# Patient Record
Sex: MALE | Race: WHITE | URBAN - METROPOLITAN AREA
[De-identification: names, ages, dates, MRNs, and addresses within clinical notes are randomized per-mention and may not be internally consistent; named-entity substitution may affect disease eponyms.]

---

## 2017-01-12 ENCOUNTER — TELEPHONE (OUTPATIENT)
Dept: FAMILY MEDICINE CLINIC | Facility: CLINIC | Age: 57
End: 2017-01-12

## 2017-01-12 NOTE — TELEPHONE ENCOUNTER
Pt is asking to be seen with Dr. Hilaria Holley only   Pt use to be a patient of Dr. Hilaria Holley more than 5 years ago   Please advise if patient can be seen with PCP   Please f/u with patient

## 2017-02-09 ENCOUNTER — OFFICE VISIT (OUTPATIENT)
Dept: FAMILY MEDICINE CLINIC | Facility: CLINIC | Age: 57
End: 2017-02-09

## 2017-02-09 VITALS
RESPIRATION RATE: 16 BRPM | BODY MASS INDEX: 38.65 KG/M2 | DIASTOLIC BLOOD PRESSURE: 94 MMHG | SYSTOLIC BLOOD PRESSURE: 154 MMHG | TEMPERATURE: 98 F | WEIGHT: 270 LBS | HEART RATE: 80 BPM | HEIGHT: 70 IN

## 2017-02-09 DIAGNOSIS — Z86.19 HISTORY OF HEPATITIS C: ICD-10-CM

## 2017-02-09 DIAGNOSIS — Z86.010 HISTORY OF COLON POLYPS: ICD-10-CM

## 2017-02-09 DIAGNOSIS — Z00.00 ANNUAL PHYSICAL EXAM: Primary | ICD-10-CM

## 2017-02-09 DIAGNOSIS — E66.9 OBESITY (BMI 35.0-39.9 WITHOUT COMORBIDITY): ICD-10-CM

## 2017-02-09 DIAGNOSIS — Z82.49 FAMILY HISTORY OF PREMATURE CORONARY HEART DISEASE: ICD-10-CM

## 2017-02-09 DIAGNOSIS — R03.0 ELEVATED BP WITHOUT DIAGNOSIS OF HYPERTENSION: ICD-10-CM

## 2017-02-09 DIAGNOSIS — G47.33 OSA (OBSTRUCTIVE SLEEP APNEA): ICD-10-CM

## 2017-02-09 PROCEDURE — 99396 PREV VISIT EST AGE 40-64: CPT | Performed by: FAMILY MEDICINE

## 2017-02-09 RX ORDER — IBUPROFEN 400 MG/1
2 TABLET ORAL
COMMUNITY
Start: 2013-04-01

## 2017-02-09 NOTE — PROGRESS NOTES
HPI:    Patient ID: Delmar Flores is a 60year old male.   Patient presents with:  Routine Physical  Referral: CPAP    HPI  Hx colon polyps, gallstones (asymptomatic),  Hep C (chronic) succesfully treaty, [place],obesity  Tonsillectomy  FH F DM, heart dis - in distress noted, well developed, well nourishhed, is obese4  Head/Face: normocephalic  Eyes/Vision: pupils are equal, round and reactive to light conjunctiva and lids are normal extraocular motion is intact  Ears/Audiometry:ear canals normal,  tympanic memb apnea)  Needs new equipment, will find out if new sleep study needed    6. History of hepatitis C  Successfully treated, resolved    7. Elevated BP without diagnosis of hypertension  Needs lifestyle changes, is aware.  If BP remains above 140 syst in 6 mo,

## 2017-10-16 ENCOUNTER — OFFICE VISIT (OUTPATIENT)
Dept: FAMILY MEDICINE CLINIC | Facility: CLINIC | Age: 57
End: 2017-10-16

## 2017-10-16 VITALS
TEMPERATURE: 98 F | DIASTOLIC BLOOD PRESSURE: 110 MMHG | BODY MASS INDEX: 40 KG/M2 | WEIGHT: 277 LBS | SYSTOLIC BLOOD PRESSURE: 174 MMHG | HEART RATE: 85 BPM | RESPIRATION RATE: 16 BRPM

## 2017-10-16 DIAGNOSIS — G47.33 OBSTRUCTIVE SLEEP APNEA SYNDROME: Primary | ICD-10-CM

## 2017-10-16 DIAGNOSIS — M25.511 RIGHT SHOULDER PAIN, UNSPECIFIED CHRONICITY: ICD-10-CM

## 2017-10-16 DIAGNOSIS — J01.00 ACUTE NON-RECURRENT MAXILLARY SINUSITIS: ICD-10-CM

## 2017-10-16 DIAGNOSIS — I10 ESSENTIAL HYPERTENSION: ICD-10-CM

## 2017-10-16 PROCEDURE — 99212 OFFICE O/P EST SF 10 MIN: CPT | Performed by: FAMILY MEDICINE

## 2017-10-16 PROCEDURE — 99214 OFFICE O/P EST MOD 30 MIN: CPT | Performed by: FAMILY MEDICINE

## 2017-10-16 RX ORDER — LISINOPRIL 10 MG/1
10 TABLET ORAL DAILY
Qty: 30 TABLET | Refills: 0 | Status: SHIPPED | OUTPATIENT
Start: 2017-10-16 | End: 2017-10-25

## 2017-10-16 RX ORDER — AMOXICILLIN AND CLAVULANATE POTASSIUM 875; 125 MG/1; MG/1
1 TABLET, FILM COATED ORAL 2 TIMES DAILY
Qty: 20 TABLET | Refills: 0 | Status: SHIPPED | OUTPATIENT
Start: 2017-10-16 | End: 2017-10-25

## 2017-10-17 NOTE — PROGRESS NOTES
HPI:    Dallas Tucker is a 59year old male presents to clinic with multiple concerns. Patient gives an approx 2 week history of chills, body aches, congestion, headaches, facial pressure and a cough.  States that he has taken some OTC meds with tempora mouth 2 (two) times daily. Disp: 20 tablet Rfl: 0   lisinopril 10 MG Oral Tab Take 1 tablet (10 mg total) by mouth daily. Disp: 30 tablet Rfl: 0   ibuprofen 400 MG Oral Tab Take 2 tablets by mouth every 4 to 6 hours as needed.  Disp:  Rfl:    BLACK ELDERBER discussed with patient including heart attacks and strokes   - Lisinopril 10 mg started  - low salt diet advised  - to follow up in 1 week or sooner PRN    Acute non-recurrent maxillary sinusitis  - Augmentin to pharmacy   - adequate hydration and and rest

## 2017-10-24 ENCOUNTER — TELEPHONE (OUTPATIENT)
Dept: OTHER | Age: 57
End: 2017-10-24

## 2017-10-24 DIAGNOSIS — M19.90 ARTHRITIS: Primary | ICD-10-CM

## 2017-10-24 NOTE — TELEPHONE ENCOUNTER
Dr. Nic Choi, please advise regarding pt's x-ray, Pt states his issue is with his left shoulder not his right. Left x-ray is pended for your review. Also, he is wondering if an MRI is better to diagnose his issue.  Advised pt that MRIs are often not covered by in

## 2017-10-25 ENCOUNTER — OFFICE VISIT (OUTPATIENT)
Dept: FAMILY MEDICINE CLINIC | Facility: CLINIC | Age: 57
End: 2017-10-25

## 2017-10-25 VITALS
HEIGHT: 71 IN | BODY MASS INDEX: 38.64 KG/M2 | WEIGHT: 276 LBS | TEMPERATURE: 98 F | RESPIRATION RATE: 22 BRPM | SYSTOLIC BLOOD PRESSURE: 185 MMHG | DIASTOLIC BLOOD PRESSURE: 97 MMHG | HEART RATE: 92 BPM

## 2017-10-25 DIAGNOSIS — I10 ESSENTIAL HYPERTENSION: Primary | ICD-10-CM

## 2017-10-25 DIAGNOSIS — M54.2 NECK PAIN: ICD-10-CM

## 2017-10-25 PROCEDURE — 99213 OFFICE O/P EST LOW 20 MIN: CPT | Performed by: FAMILY MEDICINE

## 2017-10-25 PROCEDURE — 99212 OFFICE O/P EST SF 10 MIN: CPT | Performed by: FAMILY MEDICINE

## 2017-10-25 RX ORDER — LISINOPRIL 10 MG/1
10 TABLET ORAL DAILY
Qty: 30 TABLET | Refills: 5 | Status: SHIPPED | OUTPATIENT
Start: 2017-10-25

## 2017-10-25 RX ORDER — CYCLOBENZAPRINE HCL 10 MG
10 TABLET ORAL NIGHTLY
Qty: 15 TABLET | Refills: 0 | Status: SHIPPED | OUTPATIENT
Start: 2017-10-25 | End: 2017-11-13

## 2017-10-25 RX ORDER — METHYLPREDNISOLONE 4 MG/1
TABLET ORAL
Qty: 1 KIT | Refills: 0 | Status: SHIPPED | OUTPATIENT
Start: 2017-10-25 | End: 2017-11-13

## 2017-10-25 NOTE — PROGRESS NOTES
HPI:    Patient ID: Valarie Zaldivar is a 59year old male. Pt presents with pain of the neck area. Pt has had radiation of pain down the left arm as well. Pt has hx of CTS as well. Pt had no injury or trauma. Pt had some URI/ flu symptoms.  Pt was seen b area with probable impingement  - After discussion with patient, medrol dose pack and flexeril as discussed; To call if worse or not better; Discussed heat, gentle stretching and exercises when better; Follow up in one week if not resolved.  Note for work p

## 2017-10-30 ENCOUNTER — NURSE TRIAGE (OUTPATIENT)
Dept: FAMILY MEDICINE CLINIC | Facility: CLINIC | Age: 57
End: 2017-10-30

## 2017-10-30 RX ORDER — TRAMADOL HYDROCHLORIDE 50 MG/1
50 TABLET ORAL EVERY 6 HOURS PRN
Qty: 30 TABLET | Refills: 0 | OUTPATIENT
Start: 2017-10-30 | End: 2017-11-13

## 2017-10-30 NOTE — TELEPHONE ENCOUNTER
Pt called back and was inform of  message below. Pt verbalized understanding.  Medication was called in to [facility] per pt request.

## 2017-10-30 NOTE — TELEPHONE ENCOUNTER
LMTCB please transfer to triage.  Thanks  RN pls verified the pharmacy and call in the medication Thanks

## 2017-10-30 NOTE — TELEPHONE ENCOUNTER
Message noted; Message noted. May start tramadol as directed for pain. Erx signed and please call into pharmacy as controlled substance. To follow up with orthopedics if persistent symptoms; #[phone]. Can take otc remedies for sinus symptoms.

## 2017-10-30 NOTE — TELEPHONE ENCOUNTER
Action Requested: Summary for Provider     []  Critical Lab, Recommendations Needed  [x] Need Additional Advice  []   FYI    []   Need Orders  [] Need Medications Sent to Pharmacy  []  Other     SUMMARY: ADVICE NEEDED, continued neck/shoulder pain    Patie

## 2017-11-01 NOTE — TELEPHONE ENCOUNTER
Patient informed of x-ray. Patient states he made appointment with ortho because he is in lot of pain.

## 2017-11-06 ENCOUNTER — TELEPHONE (OUTPATIENT)
Dept: OTHER | Age: 57
End: 2017-11-06

## 2017-11-06 NOTE — TELEPHONE ENCOUNTER
NOHEMI Lester  Pt stated that he is calling to give you a update about his neck and shoulder pain it has now gone to his left bicep but the pain is a dull 3 /10 before it was a 7/10 so its getting better.  He wanted to let you know that he did not  the

## 2017-11-07 ENCOUNTER — TELEPHONE (OUTPATIENT)
Dept: ADMINISTRATIVE | Age: 57
End: 2017-11-07

## 2017-11-07 NOTE — TELEPHONE ENCOUNTER
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 FCR+ release emailed to pt per pc w/ pt ([email or web address]). Logged for processing.  NK

## 2017-11-13 ENCOUNTER — OFFICE VISIT (OUTPATIENT)
Dept: FAMILY MEDICINE CLINIC | Facility: CLINIC | Age: 57
End: 2017-11-13

## 2017-11-13 VITALS
TEMPERATURE: 98 F | SYSTOLIC BLOOD PRESSURE: 174 MMHG | BODY MASS INDEX: 38.64 KG/M2 | HEIGHT: 71 IN | RESPIRATION RATE: 18 BRPM | HEART RATE: 92 BPM | DIASTOLIC BLOOD PRESSURE: 121 MMHG | WEIGHT: 276 LBS

## 2017-11-13 DIAGNOSIS — I10 ESSENTIAL HYPERTENSION: ICD-10-CM

## 2017-11-13 DIAGNOSIS — M79.602 LEFT ARM PAIN: ICD-10-CM

## 2017-11-13 PROCEDURE — 99213 OFFICE O/P EST LOW 20 MIN: CPT | Performed by: FAMILY MEDICINE

## 2017-11-13 PROCEDURE — 99212 OFFICE O/P EST SF 10 MIN: CPT | Performed by: FAMILY MEDICINE

## 2017-11-13 RX ORDER — CYCLOBENZAPRINE HCL 10 MG
10 TABLET ORAL NIGHTLY
Qty: 30 TABLET | Refills: 0 | Status: SHIPPED | OUTPATIENT
Start: 2017-11-13

## 2017-11-13 RX ORDER — TRAMADOL HYDROCHLORIDE 50 MG/1
50 TABLET ORAL EVERY 6 HOURS PRN
Qty: 30 TABLET | Refills: 0 | Status: SHIPPED | OUTPATIENT
Start: 2017-11-13

## 2017-11-13 NOTE — TELEPHONE ENCOUNTER
,    Please sign off on form:  -Highlight patient  -Hit Chart button  -Open Telephone encounter  -Scroll to blue Hyperlink and click to open image.  -Hit ACKNOWLEDGE button on bottom right corner and left-click on plus sign and drag signature to provider line.  -Close window.     Thank you,    Leon Mc.

## 2017-11-13 NOTE — PROGRESS NOTES
HPI:    Patient ID: Usha Haywood is a 59year old male. Pt is here for follow up on his neck pain/ left arm pains. Pt states pain has now subsided after steroids. Pt states pain is more over lateral upper arm now. Some numbness. No sig neck pains now. elevation of blood pressure; Discussed good diet/activity; Follow up as needed. No orders of the defined types were placed in this encounter.       Meds This Visit:  Signed Prescriptions Disp Refills    Cyclobenzaprine HCl 10 MG Oral Tab 30 tablet 0

## 2017-11-28 ENCOUNTER — OFFICE VISIT (OUTPATIENT)
Dept: PULMONOLOGY | Facility: CLINIC | Age: 57
End: 2017-11-28

## 2017-11-28 VITALS
RESPIRATION RATE: 18 BRPM | HEIGHT: 71 IN | SYSTOLIC BLOOD PRESSURE: 154 MMHG | DIASTOLIC BLOOD PRESSURE: 97 MMHG | WEIGHT: 275 LBS | HEART RATE: 81 BPM | OXYGEN SATURATION: 100 % | BODY MASS INDEX: 38.5 KG/M2

## 2017-11-28 DIAGNOSIS — G47.33 OSA (OBSTRUCTIVE SLEEP APNEA): Primary | ICD-10-CM

## 2017-11-28 PROCEDURE — 99212 OFFICE O/P EST SF 10 MIN: CPT | Performed by: INTERNAL MEDICINE

## 2017-11-28 PROCEDURE — 99244 OFF/OP CNSLTJ NEW/EST MOD 40: CPT | Performed by: INTERNAL MEDICINE

## 2017-11-28 NOTE — H&P
Referring Physician  Nanda Lacey MD    Chief Complaint  Sleep apnea evaluation    History of Present Illness  Patient is a 49-year-old  male who presents today for evaluation of obstructive sleep apnea.   He admits to polysomnography performed w daily. Disp: 30 tablet Rfl: 5   ibuprofen 400 MG Oral Tab Take 2 tablets by mouth every 4 to 6 hours as needed. Disp:  Rfl:    BLACK ELDERBERRY,BERRY-FLOWER, OR Take by mouth.  Disp:  Rfl:      No current facility-administered medications on file prior to v

## 2018-01-09 ENCOUNTER — OFFICE VISIT (OUTPATIENT)
Dept: SLEEP CENTER | Age: 58
End: 2018-01-09
Attending: INTERNAL MEDICINE
Payer: COMMERCIAL

## 2018-01-09 DIAGNOSIS — Z76.89 SLEEP CONCERN: Primary | ICD-10-CM

## 2018-01-09 PROCEDURE — 95811 POLYSOM 6/>YRS CPAP 4/> PARM: CPT

## 2018-01-11 NOTE — PROCEDURES
[address] [facility]  Accredited by the [place] of [employer] (AASM)    PATIENT'S NAME: Dipesh Thomas   ATTENDING PHYSICIAN: Annamarie Valdivia. Jb Bales DO   REFERRING PHYSICIAN: Annamarie Mullins #: [de-identified] LOCATION: minutes in the supine position. The respiratory event related arousal index was 54.7 events per hour and the spontaneous arousal index was 1.9 events per hour for a combined arousal index of 56.6 events per hour.   There were no significant periodic limb m during the diagnostic analysis, but much better consolidated with 2 long normal sleep cycles, including REM, during the treatment analysis with supine REM demonstrated on CPAP of 12 CWP.     INTERPRETATION:  The data generated from this study is consistent

## 2018-01-17 ENCOUNTER — TELEPHONE (OUTPATIENT)
Dept: PULMONOLOGY | Facility: CLINIC | Age: 58
End: 2018-01-17

## 2018-01-17 DIAGNOSIS — G47.33 OSA (OBSTRUCTIVE SLEEP APNEA): Primary | ICD-10-CM

## 2018-01-17 NOTE — TELEPHONE ENCOUNTER
----- Message from Jonny Bell DO sent at 1/16/2018  2:18 PM CST -----  You may let the patient know that I reviewed his sleep study results which reveal evidence of severe sleep apnea.   Can you place order forCPAP 12 CWP with an expiratory pressure

## 2018-01-17 NOTE — TELEPHONE ENCOUNTER
Spoke with pt informed of results as detailed by Dr. Anderson Jimenez, pt voiced understanding. Pt provided with phone number to [phone].  Pt concerned re: results stts he didn't sleep too well at study but will try new machine and see how things go

## 2018-02-08 ENCOUNTER — TELEPHONE (OUTPATIENT)
Dept: PULMONOLOGY | Facility: CLINIC | Age: 58
End: 2018-02-08

## 2018-02-08 DIAGNOSIS — G47.33 OSA (OBSTRUCTIVE SLEEP APNEA): Primary | ICD-10-CM

## 2018-02-08 NOTE — TELEPHONE ENCOUNTER
Refer to TE-1/17/18 Pt states per [facility] they have not received orders from [facility]. Pt states his encounters with [employer] have been very unpleasant. Pt states company is very rude.   Please call and refax information Thank you

## 2018-02-12 NOTE — TELEPHONE ENCOUNTER
Pt returned call, pt reports [facility] never received cpap order/information, pt is very unhappy with their customer service, pt requests order be sent elsewhere.  Order sent to [facility] faxed to [phone] and copy left in HME folder for p/

## 2018-03-12 ENCOUNTER — OFFICE VISIT (OUTPATIENT)
Dept: DERMATOLOGY CLINIC | Facility: CLINIC | Age: 58
End: 2018-03-12

## 2018-03-12 DIAGNOSIS — D23.30 BENIGN NEOPLASM OF SKIN OF FACE: ICD-10-CM

## 2018-03-12 DIAGNOSIS — D23.60 BENIGN NEOPLASM OF SKIN OF UPPER LIMB, INCLUDING SHOULDER, UNSPECIFIED LATERALITY: ICD-10-CM

## 2018-03-12 DIAGNOSIS — L82.1 SEBORRHEIC KERATOSES: ICD-10-CM

## 2018-03-12 DIAGNOSIS — D23.5 BENIGN NEOPLASM OF SKIN OF TRUNK, EXCEPT SCROTUM: ICD-10-CM

## 2018-03-12 DIAGNOSIS — L91.8 INFLAMED ACROCHORDON: Primary | ICD-10-CM

## 2018-03-12 DIAGNOSIS — D23.10 BENIGN NEOPLASM OF RIGHT EYELID: ICD-10-CM

## 2018-03-12 DIAGNOSIS — D23.4 BENIGN NEOPLASM OF SCALP AND SKIN OF NECK: ICD-10-CM

## 2018-03-12 PROCEDURE — 99212 OFFICE O/P EST SF 10 MIN: CPT | Performed by: DERMATOLOGY

## 2018-03-12 PROCEDURE — 11200 RMVL SKIN TAGS UP TO&INC 15: CPT | Performed by: DERMATOLOGY

## 2018-03-12 PROCEDURE — 99202 OFFICE O/P NEW SF 15 MIN: CPT | Performed by: DERMATOLOGY

## 2018-03-22 ENCOUNTER — OFFICE VISIT (OUTPATIENT)
Dept: PULMONOLOGY | Facility: CLINIC | Age: 58
End: 2018-03-22

## 2018-03-22 VITALS
SYSTOLIC BLOOD PRESSURE: 149 MMHG | WEIGHT: 280 LBS | BODY MASS INDEX: 39.2 KG/M2 | RESPIRATION RATE: 16 BRPM | HEART RATE: 83 BPM | OXYGEN SATURATION: 97 % | DIASTOLIC BLOOD PRESSURE: 93 MMHG | HEIGHT: 71 IN

## 2018-03-22 DIAGNOSIS — G47.33 OSA (OBSTRUCTIVE SLEEP APNEA): Primary | ICD-10-CM

## 2018-03-22 PROCEDURE — 99212 OFFICE O/P EST SF 10 MIN: CPT | Performed by: INTERNAL MEDICINE

## 2018-03-22 PROCEDURE — 99213 OFFICE O/P EST LOW 20 MIN: CPT | Performed by: INTERNAL MEDICINE

## 2018-03-22 NOTE — PROGRESS NOTES
Referring Physician  Pauline Doonvan MD    History of Present Illness  Patient seen for follow-up appointment at pulmonary clinic. He states he has been using his CPAP device on a nightly basis.   He admits to improvement in hypersomnia and fatigue when he encouraged weight loss if possible. -Encouraged ongoing tobacco cessation.     Follow Up  In 1 year    Nicci Matias, [facility]  3/22/2018  1:27 PM

## 2018-03-25 NOTE — PROGRESS NOTES
Jose Ramon Garsia is a 59year old male. HPI:     CC:  Patient presents with:  Lesion: \"New pt\"- Pt presenting today with brown raised crusty lesion to right shoulder blade not sure how long it might of been there and requesting upper body skin check.  Pt de History:   Diagnosis Date   • Cataract of both eyes 2012   • Dermatochalasis of eyelids of both eyes 2012   • Hepatitis C    • Meibomian gland dysfunction (MGD), bilateral, both upper and lower lids 2012   • Neuropathy 2007    cortisone injections   • Slee Physical Examination:     Well-developed well-nourished patient alert oriented in no acute distress.   Exam total-body performed, including scalp, head, neck, face,nails, hair, external eyes, including conjunctival mucosa, eyelids, lips external ears, inflamed. Lesions treated with scissors removal  Medically necessary as lesion inflamed and irritated. #1 lesion irritated, obstructing vision. Removal medically necessary    Benign nevi keratoses reassurance.     Accordance neck actually asymptomatic obs

## 2018-07-27 ENCOUNTER — TELEPHONE (OUTPATIENT)
Dept: INTERNAL MEDICINE | Age: 58
End: 2018-07-27

## 2018-07-27 ENCOUNTER — OFFICE VISIT (OUTPATIENT)
Dept: INTERNAL MEDICINE | Age: 58
End: 2018-07-27

## 2018-07-27 ENCOUNTER — PERMISSIONS (OUTPATIENT)
Dept: HEALTH INFORMATION MANAGEMENT | Age: 58
End: 2018-07-27

## 2018-07-27 VITALS
WEIGHT: 280.7 LBS | BODY MASS INDEX: 39.3 KG/M2 | HEIGHT: 71 IN | HEART RATE: 72 BPM | DIASTOLIC BLOOD PRESSURE: 92 MMHG | SYSTOLIC BLOOD PRESSURE: 158 MMHG

## 2018-07-27 DIAGNOSIS — G47.33 OSA ON CPAP: ICD-10-CM

## 2018-07-27 DIAGNOSIS — I10 ESSENTIAL HYPERTENSION: Primary | ICD-10-CM

## 2018-07-27 PROBLEM — M54.2 CERVICALGIA: Status: ACTIVE | Noted: 2018-07-27

## 2018-07-27 PROBLEM — E66.9 OBESITY (BMI 30-39.9): Status: ACTIVE | Noted: 2018-07-27

## 2018-07-27 PROBLEM — B18.2 CHRONIC HEPATITIS C WITHOUT HEPATIC COMA (CMD): Status: ACTIVE | Noted: 2018-07-27

## 2018-07-27 PROCEDURE — 99203 OFFICE O/P NEW LOW 30 MIN: CPT | Performed by: INTERNAL MEDICINE

## 2018-07-27 RX ORDER — LOSARTAN POTASSIUM 50 MG/1
50 TABLET ORAL DAILY
Qty: 30 TABLET | Refills: 6 | Status: SHIPPED | OUTPATIENT
Start: 2018-07-27 | End: 2018-07-27 | Stop reason: SDUPTHER

## 2018-07-27 RX ORDER — LOSARTAN POTASSIUM 50 MG/1
50 TABLET ORAL DAILY
Qty: 30 TABLET | Refills: 6 | Status: SHIPPED | OUTPATIENT
Start: 2018-07-27 | End: 2018-08-23 | Stop reason: SDUPTHER

## 2018-07-27 ASSESSMENT — PATIENT HEALTH QUESTIONNAIRE - PHQ9
CLINICAL INTERPRETATION OF PHQ2 SCORE: 0
SUM OF ALL RESPONSES TO PHQ9 QUESTIONS 1 AND 2: 0

## 2018-08-23 ENCOUNTER — OFFICE VISIT (OUTPATIENT)
Dept: INTERNAL MEDICINE | Age: 58
End: 2018-08-23

## 2018-08-23 VITALS
DIASTOLIC BLOOD PRESSURE: 96 MMHG | SYSTOLIC BLOOD PRESSURE: 148 MMHG | HEART RATE: 80 BPM | WEIGHT: 277.6 LBS | BODY MASS INDEX: 38.86 KG/M2 | HEIGHT: 71 IN

## 2018-08-23 DIAGNOSIS — I10 ESSENTIAL HYPERTENSION: Primary | ICD-10-CM

## 2018-08-23 DIAGNOSIS — B18.2 CHRONIC HEPATITIS C WITHOUT HEPATIC COMA (CMD): ICD-10-CM

## 2018-08-23 PROCEDURE — 80061 LIPID PANEL: CPT | Performed by: INTERNAL MEDICINE

## 2018-08-23 PROCEDURE — 99213 OFFICE O/P EST LOW 20 MIN: CPT | Performed by: INTERNAL MEDICINE

## 2018-08-23 PROCEDURE — 36415 COLL VENOUS BLD VENIPUNCTURE: CPT | Performed by: INTERNAL MEDICINE

## 2018-08-23 PROCEDURE — 85025 COMPLETE CBC W/AUTO DIFF WBC: CPT | Performed by: INTERNAL MEDICINE

## 2018-08-23 PROCEDURE — 80053 COMPREHEN METABOLIC PANEL: CPT | Performed by: INTERNAL MEDICINE

## 2018-08-23 RX ORDER — LOSARTAN POTASSIUM 100 MG/1
100 TABLET ORAL DAILY
Qty: 30 TABLET | Refills: 6 | Status: SHIPPED | OUTPATIENT
Start: 2018-08-23 | End: 2019-02-08 | Stop reason: SDUPTHER

## 2018-08-24 ENCOUNTER — TELEPHONE (OUTPATIENT)
Dept: INTERNAL MEDICINE | Age: 58
End: 2018-08-24

## 2018-08-24 LAB
ALBUMIN SERPL-MCNC: 4.4 G/DL (ref 3.6–5.1)
ALBUMIN/GLOB SERPL: 1.3 {RATIO} (ref 1–2.4)
ALP SERPL-CCNC: 98 UNITS/L (ref 45–117)
ALT SERPL-CCNC: 58 UNITS/L
ANION GAP SERPL CALC-SCNC: 13 MMOL/L (ref 10–20)
AST SERPL-CCNC: 36 UNITS/L
BASOPHILS # BLD AUTO: 0 K/MCL (ref 0–0.3)
BASOPHILS NFR BLD AUTO: 0 %
BILIRUB SERPL-MCNC: 1.3 MG/DL (ref 0.2–1)
BUN SERPL-MCNC: 12 MG/DL (ref 6–20)
BUN/CREAT SERPL: 15 (ref 7–25)
CALCIUM SERPL-MCNC: 9 MG/DL (ref 8.4–10.2)
CHLORIDE SERPL-SCNC: 105 MMOL/L (ref 98–107)
CHOLEST SERPL-MCNC: 186 MG/DL
CHOLEST/HDLC SERPL: 5.5 {RATIO}
CO2 SERPL-SCNC: 28 MMOL/L (ref 21–32)
CREAT SERPL-MCNC: 0.8 MG/DL (ref 0.67–1.17)
DIFFERENTIAL METHOD BLD: NORMAL
EOSINOPHIL # BLD AUTO: 0.2 K/MCL (ref 0.1–0.5)
EOSINOPHIL NFR SPEC: 3 %
ERYTHROCYTE [DISTWIDTH] IN BLOOD: 12 % (ref 11–15)
FASTING STATUS PATIENT QL REPORTED: 30 HRS
GLOBULIN SER-MCNC: 3.4 G/DL (ref 2–4)
GLUCOSE SERPL-MCNC: 89 MG/DL (ref 65–99)
HCT VFR BLD CALC: 45 % (ref 39–51)
HDLC SERPL-MCNC: 34 MG/DL
HGB BLD-MCNC: 15.5 G/DL (ref 13–17)
IMM GRANULOCYTES # BLD AUTO: 0.1 K/MCL (ref 0–0.2)
IMM GRANULOCYTES NFR BLD: 1 %
LDLC SERPL-MCNC: 109 MG/DL
LENGTH OF FAST TIME PATIENT: 30 HRS
LYMPHOCYTES # BLD MANUAL: 2.1 K/MCL (ref 1–4)
LYMPHOCYTES NFR BLD MANUAL: 29 %
MCH RBC QN AUTO: 29.5 PG (ref 26–34)
MCHC RBC AUTO-ENTMCNC: 34.4 G/DL (ref 32–36.5)
MCV RBC AUTO: 85.7 FL (ref 78–100)
MONOCYTES # BLD MANUAL: 0.7 K/MCL (ref 0.3–0.9)
MONOCYTES NFR BLD MANUAL: 10 %
NEUTROPHILS # BLD: 4.1 K/MCL (ref 1.8–7.7)
NEUTROPHILS NFR BLD AUTO: 57 %
NONHDLC SERPL-MCNC: 152 MG/DL
NRBC BLD MANUAL-RTO: 0 /100 WBC
PLATELET # BLD: 247 K/MCL (ref 140–450)
POTASSIUM SERPL-SCNC: 4.2 MMOL/L (ref 3.4–5.1)
PROT SERPL-MCNC: 7.8 G/DL (ref 6.4–8.2)
RBC # BLD: 5.25 MIL/MCL (ref 4.5–5.9)
SODIUM SERPL-SCNC: 142 MMOL/L (ref 135–145)
TRIGL SERPL-MCNC: 217 MG/DL
WBC # BLD: 7.2 K/MCL (ref 4.2–11)

## 2018-09-28 ENCOUNTER — OFFICE VISIT (OUTPATIENT)
Dept: INTERNAL MEDICINE | Age: 58
End: 2018-09-28

## 2018-09-28 VITALS
HEART RATE: 68 BPM | WEIGHT: 286 LBS | BODY MASS INDEX: 40.04 KG/M2 | HEIGHT: 71 IN | DIASTOLIC BLOOD PRESSURE: 92 MMHG | SYSTOLIC BLOOD PRESSURE: 142 MMHG

## 2018-09-28 DIAGNOSIS — I10 ESSENTIAL HYPERTENSION: Primary | ICD-10-CM

## 2018-09-28 PROBLEM — E78.2 HYPERLIPIDEMIA, MIXED: Status: ACTIVE | Noted: 2018-09-28

## 2018-09-28 PROCEDURE — 99213 OFFICE O/P EST LOW 20 MIN: CPT | Performed by: INTERNAL MEDICINE

## 2018-09-28 RX ORDER — AMLODIPINE BESYLATE 5 MG/1
5 TABLET ORAL DAILY
Qty: 30 TABLET | Refills: 11 | Status: SHIPPED | OUTPATIENT
Start: 2018-09-28 | End: 2020-02-27 | Stop reason: SDUPTHER

## 2019-02-08 RX ORDER — LOSARTAN POTASSIUM 100 MG/1
100 TABLET ORAL DAILY
Qty: 90 TABLET | Refills: 1 | Status: SHIPPED | OUTPATIENT
Start: 2019-02-08 | End: 2019-08-14 | Stop reason: SDUPTHER

## 2019-08-14 RX ORDER — LOSARTAN POTASSIUM 100 MG/1
TABLET ORAL
Qty: 90 TABLET | Refills: 1 | Status: SHIPPED | OUTPATIENT
Start: 2019-08-14 | End: 2020-02-06 | Stop reason: SDUPTHER

## 2020-02-06 ENCOUNTER — TELEPHONE (OUTPATIENT)
Dept: INTERNAL MEDICINE | Age: 60
End: 2020-02-06

## 2020-02-06 RX ORDER — LOSARTAN POTASSIUM 100 MG/1
TABLET ORAL
Qty: 30 TABLET | Refills: 0 | Status: SHIPPED | OUTPATIENT
Start: 2020-02-06 | End: 2020-02-27 | Stop reason: SDUPTHER

## 2020-02-27 ENCOUNTER — LAB SERVICES (OUTPATIENT)
Dept: LAB | Age: 60
End: 2020-02-27

## 2020-02-27 ENCOUNTER — OFFICE VISIT (OUTPATIENT)
Dept: INTERNAL MEDICINE | Age: 60
End: 2020-02-27

## 2020-02-27 VITALS
BODY MASS INDEX: 41.72 KG/M2 | DIASTOLIC BLOOD PRESSURE: 100 MMHG | TEMPERATURE: 99.2 F | HEART RATE: 84 BPM | HEIGHT: 71 IN | SYSTOLIC BLOOD PRESSURE: 164 MMHG | WEIGHT: 298 LBS

## 2020-02-27 DIAGNOSIS — E78.2 HYPERLIPIDEMIA, MIXED: ICD-10-CM

## 2020-02-27 DIAGNOSIS — I10 ESSENTIAL HYPERTENSION: Primary | ICD-10-CM

## 2020-02-27 DIAGNOSIS — E66.9 OBESITY (BMI 30-39.9): ICD-10-CM

## 2020-02-27 DIAGNOSIS — I10 ESSENTIAL HYPERTENSION: ICD-10-CM

## 2020-02-27 LAB
ALBUMIN SERPL-MCNC: 3.9 G/DL (ref 3.6–5.1)
ALBUMIN/GLOB SERPL: 1.1 {RATIO} (ref 1–2.4)
ALP SERPL-CCNC: 91 UNITS/L (ref 45–117)
ALT SERPL-CCNC: 49 UNITS/L
ANION GAP SERPL CALC-SCNC: 9 MMOL/L (ref 10–20)
AST SERPL-CCNC: 23 UNITS/L
BILIRUB SERPL-MCNC: 0.7 MG/DL (ref 0.2–1)
BUN SERPL-MCNC: 12 MG/DL (ref 6–20)
BUN/CREAT SERPL: 17 (ref 7–25)
CALCIUM SERPL-MCNC: 9 MG/DL (ref 8.4–10.2)
CHLORIDE SERPL-SCNC: 108 MMOL/L (ref 98–107)
CO2 SERPL-SCNC: 27 MMOL/L (ref 21–32)
CREAT SERPL-MCNC: 0.71 MG/DL (ref 0.67–1.17)
GLOBULIN SER-MCNC: 3.4 G/DL (ref 2–4)
GLUCOSE SERPL-MCNC: 105 MG/DL (ref 65–99)
POTASSIUM SERPL-SCNC: 4 MMOL/L (ref 3.4–5.1)
PROT SERPL-MCNC: 7.3 G/DL (ref 6.4–8.2)
SODIUM SERPL-SCNC: 140 MMOL/L (ref 135–145)

## 2020-02-27 PROCEDURE — 80053 COMPREHEN METABOLIC PANEL: CPT | Performed by: INTERNAL MEDICINE

## 2020-02-27 PROCEDURE — 99213 OFFICE O/P EST LOW 20 MIN: CPT | Performed by: INTERNAL MEDICINE

## 2020-02-27 PROCEDURE — 36415 COLL VENOUS BLD VENIPUNCTURE: CPT | Performed by: INTERNAL MEDICINE

## 2020-02-27 RX ORDER — AMLODIPINE BESYLATE 10 MG/1
10 TABLET ORAL DAILY
Qty: 30 TABLET | Refills: 3 | Status: SHIPPED | OUTPATIENT
Start: 2020-02-27 | End: 2020-05-08 | Stop reason: SINTOL

## 2020-02-27 RX ORDER — LOSARTAN POTASSIUM 100 MG/1
100 TABLET ORAL DAILY
Qty: 30 TABLET | Refills: 3 | Status: SHIPPED | OUTPATIENT
Start: 2020-02-27 | End: 2020-05-20

## 2020-02-27 ASSESSMENT — PATIENT HEALTH QUESTIONNAIRE - PHQ9
1. LITTLE INTEREST OR PLEASURE IN DOING THINGS: NOT AT ALL
SUM OF ALL RESPONSES TO PHQ9 QUESTIONS 1 AND 2: 0
SUM OF ALL RESPONSES TO PHQ9 QUESTIONS 1 AND 2: 0
2. FEELING DOWN, DEPRESSED OR HOPELESS: NOT AT ALL

## 2020-02-28 ENCOUNTER — E-ADVICE (OUTPATIENT)
Dept: INTERNAL MEDICINE | Age: 60
End: 2020-02-28

## 2020-05-06 ENCOUNTER — TELEPHONE (OUTPATIENT)
Dept: SCHEDULING | Age: 60
End: 2020-05-06

## 2020-05-08 ENCOUNTER — TELEPHONE (OUTPATIENT)
Dept: INTERNAL MEDICINE | Age: 60
End: 2020-05-08

## 2020-05-11 ENCOUNTER — OFFICE VISIT (OUTPATIENT)
Dept: INTERNAL MEDICINE | Age: 60
End: 2020-05-11

## 2020-05-11 VITALS — HEART RATE: 94 BPM | SYSTOLIC BLOOD PRESSURE: 147 MMHG | DIASTOLIC BLOOD PRESSURE: 91 MMHG

## 2020-05-11 DIAGNOSIS — Z01.30 BP CHECK: Primary | ICD-10-CM

## 2020-05-20 RX ORDER — LOSARTAN POTASSIUM 100 MG/1
TABLET ORAL
Qty: 90 TABLET | Refills: 1 | Status: SHIPPED | OUTPATIENT
Start: 2020-05-20 | End: 2020-12-18 | Stop reason: SDUPTHER

## 2020-10-15 ENCOUNTER — IMAGING SERVICES (OUTPATIENT)
Dept: GENERAL RADIOLOGY | Age: 60
End: 2020-10-15
Attending: NURSE PRACTITIONER

## 2020-10-15 ENCOUNTER — WALK IN (OUTPATIENT)
Dept: URGENT CARE | Age: 60
End: 2020-10-15

## 2020-10-15 VITALS
OXYGEN SATURATION: 97 % | TEMPERATURE: 98.7 F | DIASTOLIC BLOOD PRESSURE: 106 MMHG | WEIGHT: 285 LBS | HEART RATE: 88 BPM | BODY MASS INDEX: 39.75 KG/M2 | SYSTOLIC BLOOD PRESSURE: 161 MMHG | RESPIRATION RATE: 14 BRPM

## 2020-10-15 DIAGNOSIS — M25.562 ACUTE PAIN OF LEFT KNEE: ICD-10-CM

## 2020-10-15 DIAGNOSIS — M25.562 ACUTE PAIN OF LEFT KNEE: Primary | ICD-10-CM

## 2020-10-15 PROCEDURE — 73564 X-RAY EXAM KNEE 4 OR MORE: CPT | Performed by: RADIOLOGY

## 2020-10-15 PROCEDURE — 99214 OFFICE O/P EST MOD 30 MIN: CPT | Performed by: NURSE PRACTITIONER

## 2020-10-15 ASSESSMENT — ENCOUNTER SYMPTOMS
FEVER: 0
CHILLS: 0
WOUND: 0
WEAKNESS: 0

## 2020-10-26 ENCOUNTER — OFFICE VISIT (OUTPATIENT)
Dept: ORTHOPEDICS | Age: 60
End: 2020-10-26
Attending: NURSE PRACTITIONER

## 2020-10-26 VITALS — BODY MASS INDEX: 41.27 KG/M2 | WEIGHT: 294.75 LBS | HEIGHT: 71 IN

## 2020-10-26 DIAGNOSIS — S83.242A ACUTE MEDIAL MENISCAL TEAR, LEFT, INITIAL ENCOUNTER: Primary | ICD-10-CM

## 2020-10-26 PROCEDURE — 20610 DRAIN/INJ JOINT/BURSA W/O US: CPT | Performed by: ORTHOPAEDIC SURGERY

## 2020-10-26 PROCEDURE — 99213 OFFICE O/P EST LOW 20 MIN: CPT | Performed by: ORTHOPAEDIC SURGERY

## 2020-10-26 RX ORDER — TRIAMCINOLONE ACETONIDE 40 MG/ML
80 INJECTION, SUSPENSION INTRA-ARTICULAR; INTRAMUSCULAR ONCE
Status: COMPLETED | OUTPATIENT
Start: 2020-10-26 | End: 2020-10-26

## 2020-10-26 RX ADMIN — TRIAMCINOLONE ACETONIDE 80 MG: 40 INJECTION, SUSPENSION INTRA-ARTICULAR; INTRAMUSCULAR at 15:26

## 2020-11-12 ENCOUNTER — APPOINTMENT (OUTPATIENT)
Dept: MRI IMAGING | Age: 60
End: 2020-11-12
Attending: ORTHOPAEDIC SURGERY

## 2020-11-19 ENCOUNTER — IMAGING SERVICES (OUTPATIENT)
Dept: MRI IMAGING | Age: 60
End: 2020-11-19
Attending: ORTHOPAEDIC SURGERY

## 2020-11-19 DIAGNOSIS — S83.242A ACUTE MEDIAL MENISCAL TEAR, LEFT, INITIAL ENCOUNTER: ICD-10-CM

## 2020-11-19 PROCEDURE — 73721 MRI JNT OF LWR EXTRE W/O DYE: CPT | Performed by: RADIOLOGY

## 2020-12-01 ENCOUNTER — E-ADVICE (OUTPATIENT)
Dept: ORTHOPEDICS | Age: 60
End: 2020-12-01

## 2020-12-03 ENCOUNTER — E-ADVICE (OUTPATIENT)
Dept: INTERNAL MEDICINE | Age: 60
End: 2020-12-03

## 2020-12-03 ENCOUNTER — HOSPITAL ENCOUNTER (OUTPATIENT)
Age: 60
End: 2020-12-03
Attending: ORTHOPAEDIC SURGERY | Admitting: ORTHOPAEDIC SURGERY

## 2020-12-03 DIAGNOSIS — Z01.812 PRE-PROCEDURAL LABORATORY EXAMINATIONS: Primary | ICD-10-CM

## 2020-12-18 ENCOUNTER — LAB SERVICES (OUTPATIENT)
Dept: LAB | Age: 60
End: 2020-12-18

## 2020-12-18 ENCOUNTER — OFFICE VISIT (OUTPATIENT)
Dept: INTERNAL MEDICINE | Age: 60
End: 2020-12-18

## 2020-12-18 VITALS
SYSTOLIC BLOOD PRESSURE: 164 MMHG | BODY MASS INDEX: 40.46 KG/M2 | HEART RATE: 83 BPM | WEIGHT: 289 LBS | TEMPERATURE: 96.5 F | HEIGHT: 71 IN | DIASTOLIC BLOOD PRESSURE: 100 MMHG | OXYGEN SATURATION: 98 %

## 2020-12-18 DIAGNOSIS — I10 ESSENTIAL HYPERTENSION: ICD-10-CM

## 2020-12-18 DIAGNOSIS — I10 ESSENTIAL HYPERTENSION: Primary | ICD-10-CM

## 2020-12-18 DIAGNOSIS — S83.232A COMPLEX TEAR OF MEDIAL MENISCUS OF LEFT KNEE AS CURRENT INJURY, INITIAL ENCOUNTER: ICD-10-CM

## 2020-12-18 LAB
BASOPHILS # BLD: 0.1 K/MCL (ref 0–0.3)
BASOPHILS NFR BLD: 1 %
DEPRECATED RDW RBC: 40.4 FL (ref 39–50)
EOSINOPHIL # BLD: 0.3 K/MCL (ref 0–0.5)
EOSINOPHIL NFR BLD: 3 %
ERYTHROCYTE [DISTWIDTH] IN BLOOD: 13.1 % (ref 11–15)
HCT VFR BLD CALC: 43.3 % (ref 39–51)
HGB BLD-MCNC: 14.8 G/DL (ref 13–17)
IMM GRANULOCYTES # BLD AUTO: 0.2 K/MCL (ref 0–0.2)
IMM GRANULOCYTES # BLD: 3 %
LYMPHOCYTES # BLD: 1.8 K/MCL (ref 1–4)
LYMPHOCYTES NFR BLD: 22 %
MCH RBC QN AUTO: 29.3 PG (ref 26–34)
MCHC RBC AUTO-ENTMCNC: 34.2 G/DL (ref 32–36.5)
MCV RBC AUTO: 85.7 FL (ref 78–100)
MONOCYTES # BLD: 0.8 K/MCL (ref 0.3–0.9)
MONOCYTES NFR BLD: 10 %
NEUTROPHILS # BLD: 4.8 K/MCL (ref 1.8–7.7)
NEUTROPHILS NFR BLD: 61 %
NRBC BLD MANUAL-RTO: 0 /100 WBC
PLATELET # BLD AUTO: 230 K/MCL (ref 140–450)
RBC # BLD: 5.05 MIL/MCL (ref 4.5–5.9)
WBC # BLD: 7.9 K/MCL (ref 4.2–11)

## 2020-12-18 PROCEDURE — 85025 COMPLETE CBC W/AUTO DIFF WBC: CPT | Performed by: INTERNAL MEDICINE

## 2020-12-18 PROCEDURE — 93000 ELECTROCARDIOGRAM COMPLETE: CPT | Performed by: INTERNAL MEDICINE

## 2020-12-18 PROCEDURE — 80053 COMPREHEN METABOLIC PANEL: CPT | Performed by: INTERNAL MEDICINE

## 2020-12-18 PROCEDURE — 99244 OFF/OP CNSLTJ NEW/EST MOD 40: CPT | Performed by: INTERNAL MEDICINE

## 2020-12-18 PROCEDURE — 36415 COLL VENOUS BLD VENIPUNCTURE: CPT | Performed by: INTERNAL MEDICINE

## 2020-12-18 RX ORDER — HYDROCHLOROTHIAZIDE 25 MG/1
25 TABLET ORAL DAILY
Qty: 90 TABLET | Refills: 3 | Status: SHIPPED | OUTPATIENT
Start: 2020-12-18 | End: 2021-04-12 | Stop reason: ALTCHOICE

## 2020-12-18 RX ORDER — LOSARTAN POTASSIUM 100 MG/1
100 TABLET ORAL DAILY
Qty: 90 TABLET | Refills: 1 | Status: SHIPPED | OUTPATIENT
Start: 2020-12-18 | End: 2021-04-12

## 2020-12-18 RX ORDER — IBUPROFEN 200 MG
400 TABLET ORAL
Status: SHIPPED | COMMUNITY
Start: 2020-12-18 | End: 2022-04-12 | Stop reason: ALTCHOICE

## 2020-12-18 ASSESSMENT — PATIENT HEALTH QUESTIONNAIRE - PHQ9
CLINICAL INTERPRETATION OF PHQ9 SCORE: NO FURTHER SCREENING NEEDED
SUM OF ALL RESPONSES TO PHQ9 QUESTIONS 1 AND 2: 0
2. FEELING DOWN, DEPRESSED OR HOPELESS: NOT AT ALL
CLINICAL INTERPRETATION OF PHQ2 SCORE: NO FURTHER SCREENING NEEDED
SUM OF ALL RESPONSES TO PHQ9 QUESTIONS 1 AND 2: 0
1. LITTLE INTEREST OR PLEASURE IN DOING THINGS: NOT AT ALL

## 2020-12-19 LAB
ALBUMIN SERPL-MCNC: 4 G/DL (ref 3.6–5.1)
ALBUMIN/GLOB SERPL: 1.3 {RATIO} (ref 1–2.4)
ALP SERPL-CCNC: 98 UNITS/L (ref 45–117)
ALT SERPL-CCNC: 35 UNITS/L
ANION GAP SERPL CALC-SCNC: 13 MMOL/L (ref 10–20)
AST SERPL-CCNC: 18 UNITS/L
BILIRUB SERPL-MCNC: 0.8 MG/DL (ref 0.2–1)
BUN SERPL-MCNC: 8 MG/DL (ref 6–20)
BUN/CREAT SERPL: 11 (ref 7–25)
CALCIUM SERPL-MCNC: 8.5 MG/DL (ref 8.4–10.2)
CHLORIDE SERPL-SCNC: 107 MMOL/L (ref 98–107)
CO2 SERPL-SCNC: 26 MMOL/L (ref 21–32)
CREAT SERPL-MCNC: 0.7 MG/DL (ref 0.67–1.17)
FASTING DURATION TIME PATIENT: 0 HOURS
GFR SERPLBLD BASED ON 1.73 SQ M-ARVRAT: >90 ML/MIN/1.73M2
GLOBULIN SER-MCNC: 3 G/DL (ref 2–4)
GLUCOSE SERPL-MCNC: 99 MG/DL (ref 65–99)
POTASSIUM SERPL-SCNC: 3.8 MMOL/L (ref 3.4–5.1)
PROT SERPL-MCNC: 7 G/DL (ref 6.4–8.2)
SODIUM SERPL-SCNC: 142 MMOL/L (ref 135–145)

## 2020-12-23 ENCOUNTER — OFFICE VISIT (OUTPATIENT)
Dept: INTERNAL MEDICINE | Age: 60
End: 2020-12-23

## 2020-12-23 VITALS — SYSTOLIC BLOOD PRESSURE: 165 MMHG | DIASTOLIC BLOOD PRESSURE: 96 MMHG | HEART RATE: 85 BPM

## 2020-12-23 DIAGNOSIS — I10 ESSENTIAL HYPERTENSION: Primary | ICD-10-CM

## 2020-12-23 RX ORDER — METOPROLOL SUCCINATE 25 MG/1
25 TABLET, EXTENDED RELEASE ORAL DAILY
Qty: 30 TABLET | Refills: 11 | Status: CANCELLED | OUTPATIENT
Start: 2020-12-23

## 2020-12-24 ENCOUNTER — TELEPHONE (OUTPATIENT)
Dept: ORTHOPEDICS | Age: 60
End: 2020-12-24

## 2020-12-24 ASSESSMENT — ACTIVITIES OF DAILY LIVING (ADL)
RECENT_DECLINE_ADL: NO
CHRONIC_PAIN_PRESENT: YES, CHRONIC
SENSORY_SUPPORT_DEVICES: EYEGLASSES
NEEDS_ASSIST: NO
ADL_SHORT_OF_BREATH: NO
ADL_SCORE: 12
ADL_BEFORE_ADMISSION: INDEPENDENT

## 2020-12-24 ASSESSMENT — COGNITIVE AND FUNCTIONAL STATUS - GENERAL
ARE YOU BLIND OR DO YOU HAVE SERIOUS DIFFICULTY SEEING, EVEN WHEN WEARING GLASSES: NO
ARE YOU DEAF OR DO YOU HAVE SERIOUS DIFFICULTY  HEARING: NO

## 2020-12-26 ENCOUNTER — LAB SERVICES (OUTPATIENT)
Dept: LAB | Age: 60
End: 2020-12-26

## 2020-12-26 DIAGNOSIS — Z01.812 PRE-PROCEDURAL LABORATORY EXAMINATIONS: ICD-10-CM

## 2020-12-26 PROCEDURE — U0003 INFECTIOUS AGENT DETECTION BY NUCLEIC ACID (DNA OR RNA); SEVERE ACUTE RESPIRATORY SYNDROME CORONAVIRUS 2 (SARS-COV-2) (CORONAVIRUS DISEASE [COVID-19]), AMPLIFIED PROBE TECHNIQUE, MAKING USE OF HIGH THROUGHPUT TECHNOLOGIES AS DESCRIBED BY CMS-2020-01-R: HCPCS | Performed by: INTERNAL MEDICINE

## 2020-12-27 LAB
SARS-COV-2 RNA RESP QL NAA+PROBE: NOT DETECTED
SERVICE CMNT-IMP: NORMAL
SERVICE CMNT-IMP: NORMAL

## 2021-01-07 ENCOUNTER — OFFICE VISIT (OUTPATIENT)
Dept: INTERNAL MEDICINE | Age: 61
End: 2021-01-07

## 2021-01-07 VITALS
HEIGHT: 71 IN | RESPIRATION RATE: 18 BRPM | TEMPERATURE: 97.2 F | WEIGHT: 286.7 LBS | BODY MASS INDEX: 40.14 KG/M2 | SYSTOLIC BLOOD PRESSURE: 160 MMHG | DIASTOLIC BLOOD PRESSURE: 96 MMHG | HEART RATE: 125 BPM

## 2021-01-07 DIAGNOSIS — I10 ESSENTIAL HYPERTENSION: Primary | ICD-10-CM

## 2021-01-07 PROCEDURE — 99213 OFFICE O/P EST LOW 20 MIN: CPT | Performed by: INTERNAL MEDICINE

## 2021-01-07 RX ORDER — METOPROLOL SUCCINATE 25 MG/1
25 TABLET, EXTENDED RELEASE ORAL DAILY
Qty: 90 TABLET | Refills: 3 | Status: SHIPPED | OUTPATIENT
Start: 2021-01-07 | End: 2021-02-04 | Stop reason: SDUPTHER

## 2021-01-11 ENCOUNTER — TELEPHONE (OUTPATIENT)
Dept: TELEHEALTH | Age: 61
End: 2021-01-11

## 2021-01-12 DIAGNOSIS — Z01.812 PRE-PROCEDURAL LABORATORY EXAMINATIONS: Primary | ICD-10-CM

## 2021-01-13 ASSESSMENT — ACTIVITIES OF DAILY LIVING (ADL)
ADL_SHORT_OF_BREATH: NO
CHRONIC_PAIN_PRESENT: NO
ADL_BEFORE_ADMISSION: INDEPENDENT
NEEDS_ASSIST: NO
HISTORY OF FALLING IN THE LAST YEAR (PRIOR TO ADMISSION): NO
ADL_SCORE: 12
RECENT_DECLINE_ADL: NO
SENSORY_SUPPORT_DEVICES: EYEGLASSES

## 2021-01-14 ENCOUNTER — OFFICE VISIT (OUTPATIENT)
Dept: EDUCATION SERVICES | Age: 61
End: 2021-01-14

## 2021-01-14 VITALS — HEIGHT: 71 IN | BODY MASS INDEX: 41.05 KG/M2 | WEIGHT: 293.21 LBS

## 2021-01-14 DIAGNOSIS — I10 ESSENTIAL HYPERTENSION: Primary | ICD-10-CM

## 2021-01-14 PROCEDURE — 97802 MEDICAL NUTRITION INDIV IN: CPT | Performed by: DIETITIAN, REGISTERED

## 2021-01-16 ENCOUNTER — LAB SERVICES (OUTPATIENT)
Dept: LAB | Age: 61
End: 2021-01-16

## 2021-01-16 DIAGNOSIS — Z01.812 PRE-PROCEDURAL LABORATORY EXAMINATIONS: ICD-10-CM

## 2021-01-16 DIAGNOSIS — I10 ESSENTIAL HYPERTENSION: ICD-10-CM

## 2021-01-16 PROCEDURE — U0003 INFECTIOUS AGENT DETECTION BY NUCLEIC ACID (DNA OR RNA); SEVERE ACUTE RESPIRATORY SYNDROME CORONAVIRUS 2 (SARS-COV-2) (CORONAVIRUS DISEASE [COVID-19]), AMPLIFIED PROBE TECHNIQUE, MAKING USE OF HIGH THROUGHPUT TECHNOLOGIES AS DESCRIBED BY CMS-2020-01-R: HCPCS | Performed by: INTERNAL MEDICINE

## 2021-01-16 PROCEDURE — U0005 INFEC AGEN DETEC AMPLI PROBE: HCPCS | Performed by: INTERNAL MEDICINE

## 2021-01-17 LAB
SARS-COV-2 RNA RESP QL NAA+PROBE: NOT DETECTED
SERVICE CMNT-IMP: NORMAL
SERVICE CMNT-IMP: NORMAL

## 2021-01-18 ENCOUNTER — ANESTHESIA EVENT (OUTPATIENT)
Dept: SURGERY | Age: 61
End: 2021-01-18

## 2021-01-19 ENCOUNTER — HOSPITAL ENCOUNTER (OUTPATIENT)
Age: 61
Discharge: HOME | End: 2021-01-19
Attending: ORTHOPAEDIC SURGERY | Admitting: ORTHOPAEDIC SURGERY

## 2021-01-19 ENCOUNTER — ANESTHESIA (OUTPATIENT)
Dept: SURGERY | Age: 61
End: 2021-01-19

## 2021-01-19 DIAGNOSIS — M25.562 LEFT KNEE PAIN, UNSPECIFIED CHRONICITY: Primary | ICD-10-CM

## 2021-01-19 DIAGNOSIS — S83.207S ACUTE TORN MENISCUS OF KNEE, LEFT, SEQUELA: ICD-10-CM

## 2021-01-19 PROCEDURE — 10002807 HB RX 258: Performed by: ANESTHESIOLOGY

## 2021-01-19 PROCEDURE — 10004348 HB COUNTER-EVAL PRE-OP

## 2021-01-19 PROCEDURE — 10002359 HB ANESTH GENERAL ADD'L 1/2 HR: Performed by: ORTHOPAEDIC SURGERY

## 2021-01-19 PROCEDURE — 10004452 HB PACU ADDL 30 MINUTES: Performed by: ORTHOPAEDIC SURGERY

## 2021-01-19 PROCEDURE — 10004316 HB COUNTER-PREP

## 2021-01-19 PROCEDURE — 10002117 HB ADDITIONAL SURGERY TIME/30 MIN: Performed by: ORTHOPAEDIC SURGERY

## 2021-01-19 PROCEDURE — 10002801 HB RX 250 W/O HCPCS: Performed by: ANESTHESIOLOGY

## 2021-01-19 PROCEDURE — 10002801 HB RX 250 W/O HCPCS: Performed by: ORTHOPAEDIC SURGERY

## 2021-01-19 PROCEDURE — 10006402 HB ORTHO COMPLEX: Performed by: ORTHOPAEDIC SURGERY

## 2021-01-19 PROCEDURE — 29881 ARTHRS KNE SRG MNISECTMY M/L: CPT | Performed by: ORTHOPAEDIC SURGERY

## 2021-01-19 PROCEDURE — A29881 ANES ARTHROSCOPY KNEE SURG; W/MENISECTMY: Performed by: ANESTHESIOLOGY

## 2021-01-19 PROCEDURE — 10002800 HB RX 250 W HCPCS: Performed by: ANESTHESIOLOGY

## 2021-01-19 PROCEDURE — 10002358 HB ANESTH GENERAL 1ST 1/2 HR: Performed by: ORTHOPAEDIC SURGERY

## 2021-01-19 PROCEDURE — 10002767 HB EXTENDED RECOVERY PER HOUR: Performed by: ORTHOPAEDIC SURGERY

## 2021-01-19 PROCEDURE — 10004451 HB PACU RECOVERY 1ST 30 MINUTES: Performed by: ORTHOPAEDIC SURGERY

## 2021-01-19 PROCEDURE — 10004560 HB COUNTER-EXTENDED RECOVERY PER HOUR

## 2021-01-19 PROCEDURE — 29881 ARTHRS KNE SRG MNISECTMY M/L: CPT | Performed by: PHYSICIAN ASSISTANT

## 2021-01-19 PROCEDURE — 10002803 HB RX 637: Performed by: ANESTHESIOLOGY

## 2021-01-19 PROCEDURE — 10006023 HB SUPPLY 272: Performed by: ORTHOPAEDIC SURGERY

## 2021-01-19 RX ORDER — HYDROCODONE BITARTRATE AND ACETAMINOPHEN 5; 325 MG/1; MG/1
1-2 TABLET ORAL
Qty: 40 TABLET | Refills: 0 | Status: SHIPPED | OUTPATIENT
Start: 2021-01-19 | End: 2022-04-12 | Stop reason: ALTCHOICE

## 2021-01-19 RX ORDER — LIDOCAINE HYDROCHLORIDE 10 MG/ML
5-10 INJECTION, SOLUTION INFILTRATION; PERINEURAL PRN
Status: DISCONTINUED | OUTPATIENT
Start: 2021-01-19 | End: 2021-01-19 | Stop reason: HOSPADM

## 2021-01-19 RX ORDER — HYDRALAZINE HYDROCHLORIDE 20 MG/ML
5 INJECTION INTRAMUSCULAR; INTRAVENOUS EVERY 10 MIN PRN
Status: DISCONTINUED | OUTPATIENT
Start: 2021-01-19 | End: 2021-01-19 | Stop reason: HOSPADM

## 2021-01-19 RX ORDER — DIPHENHYDRAMINE HCL 25 MG
25 CAPSULE ORAL EVERY 6 HOURS PRN
Status: DISCONTINUED | OUTPATIENT
Start: 2021-01-19 | End: 2021-01-19 | Stop reason: HOSPADM

## 2021-01-19 RX ORDER — SODIUM CHLORIDE, SODIUM LACTATE, POTASSIUM CHLORIDE, CALCIUM CHLORIDE 600; 310; 30; 20 MG/100ML; MG/100ML; MG/100ML; MG/100ML
INJECTION, SOLUTION INTRAVENOUS CONTINUOUS
Status: DISCONTINUED | OUTPATIENT
Start: 2021-01-19 | End: 2021-01-19 | Stop reason: HOSPADM

## 2021-01-19 RX ORDER — ONDANSETRON 4 MG/1
4 TABLET, ORALLY DISINTEGRATING ORAL EVERY 12 HOURS PRN
Status: DISCONTINUED | OUTPATIENT
Start: 2021-01-19 | End: 2021-01-19 | Stop reason: HOSPADM

## 2021-01-19 RX ORDER — PROCHLORPERAZINE EDISYLATE 5 MG/ML
5 INJECTION INTRAMUSCULAR; INTRAVENOUS EVERY 4 HOURS PRN
Status: DISCONTINUED | OUTPATIENT
Start: 2021-01-19 | End: 2021-01-19 | Stop reason: HOSPADM

## 2021-01-19 RX ORDER — PROPOFOL 10 MG/ML
INJECTION, EMULSION INTRAVENOUS PRN
Status: DISCONTINUED | OUTPATIENT
Start: 2021-01-19 | End: 2021-01-19

## 2021-01-19 RX ORDER — MIDAZOLAM HYDROCHLORIDE 1 MG/ML
INJECTION, SOLUTION INTRAMUSCULAR; INTRAVENOUS PRN
Status: DISCONTINUED | OUTPATIENT
Start: 2021-01-19 | End: 2021-01-19

## 2021-01-19 RX ORDER — DEXTROSE MONOHYDRATE 50 MG/ML
INJECTION, SOLUTION INTRAVENOUS CONTINUOUS PRN
Status: DISCONTINUED | OUTPATIENT
Start: 2021-01-19 | End: 2021-01-19 | Stop reason: HOSPADM

## 2021-01-19 RX ORDER — ONDANSETRON 2 MG/ML
4 INJECTION INTRAMUSCULAR; INTRAVENOUS EVERY 12 HOURS PRN
Status: DISCONTINUED | OUTPATIENT
Start: 2021-01-19 | End: 2021-01-19 | Stop reason: HOSPADM

## 2021-01-19 RX ORDER — FAMOTIDINE 20 MG/1
20 TABLET, FILM COATED ORAL
Status: COMPLETED | OUTPATIENT
Start: 2021-01-19 | End: 2021-01-19

## 2021-01-19 RX ORDER — HYDROCODONE BITARTRATE AND ACETAMINOPHEN 5; 325 MG/1; MG/1
1-2 TABLET ORAL EVERY 4 HOURS PRN
Status: DISCONTINUED | OUTPATIENT
Start: 2021-01-19 | End: 2021-01-19 | Stop reason: HOSPADM

## 2021-01-19 RX ORDER — DIPHENHYDRAMINE HYDROCHLORIDE 50 MG/ML
12.5 INJECTION INTRAMUSCULAR; INTRAVENOUS EVERY 6 HOURS PRN
Status: DISCONTINUED | OUTPATIENT
Start: 2021-01-19 | End: 2021-01-19 | Stop reason: HOSPADM

## 2021-01-19 RX ORDER — DEXAMETHASONE SODIUM PHOSPHATE 4 MG/ML
INJECTION, SOLUTION INTRA-ARTICULAR; INTRALESIONAL; INTRAMUSCULAR; INTRAVENOUS; SOFT TISSUE PRN
Status: DISCONTINUED | OUTPATIENT
Start: 2021-01-19 | End: 2021-01-19

## 2021-01-19 RX ORDER — DEXTROSE MONOHYDRATE 25 G/50ML
25 INJECTION, SOLUTION INTRAVENOUS PRN
Status: DISCONTINUED | OUTPATIENT
Start: 2021-01-19 | End: 2021-01-19 | Stop reason: HOSPADM

## 2021-01-19 RX ORDER — SODIUM CHLORIDE 9 MG/ML
INJECTION, SOLUTION INTRAVENOUS CONTINUOUS
Status: DISCONTINUED | OUTPATIENT
Start: 2021-01-19 | End: 2021-01-19 | Stop reason: HOSPADM

## 2021-01-19 RX ORDER — CEFAZOLIN SODIUM 1 G/3ML
INJECTION, POWDER, FOR SOLUTION INTRAMUSCULAR; INTRAVENOUS PRN
Status: DISCONTINUED | OUTPATIENT
Start: 2021-01-19 | End: 2021-01-19

## 2021-01-19 RX ORDER — MAGNESIUM HYDROXIDE 1200 MG/15ML
LIQUID ORAL PRN
Status: DISCONTINUED | OUTPATIENT
Start: 2021-01-19 | End: 2021-01-19 | Stop reason: HOSPADM

## 2021-01-19 RX ORDER — ACETAMINOPHEN 325 MG/1
650 TABLET ORAL EVERY 4 HOURS PRN
Status: DISCONTINUED | OUTPATIENT
Start: 2021-01-19 | End: 2021-01-19 | Stop reason: HOSPADM

## 2021-01-19 RX ORDER — ACETAMINOPHEN 650 MG/1
650 SUPPOSITORY RECTAL EVERY 4 HOURS PRN
Status: DISCONTINUED | OUTPATIENT
Start: 2021-01-19 | End: 2021-01-19 | Stop reason: HOSPADM

## 2021-01-19 RX ORDER — 0.9 % SODIUM CHLORIDE 0.9 %
2 VIAL (ML) INJECTION EVERY 12 HOURS SCHEDULED
Status: DISCONTINUED | OUTPATIENT
Start: 2021-01-19 | End: 2021-01-19 | Stop reason: HOSPADM

## 2021-01-19 RX ORDER — METOCLOPRAMIDE 10 MG/1
10 TABLET ORAL
Status: COMPLETED | OUTPATIENT
Start: 2021-01-19 | End: 2021-01-19

## 2021-01-19 RX ORDER — SCOLOPAMINE TRANSDERMAL SYSTEM 1 MG/1
1 PATCH, EXTENDED RELEASE TRANSDERMAL ONCE
Status: COMPLETED | OUTPATIENT
Start: 2021-01-19 | End: 2021-01-19

## 2021-01-19 RX ORDER — SODIUM CHLORIDE, SODIUM LACTATE, POTASSIUM CHLORIDE, CALCIUM CHLORIDE 600; 310; 30; 20 MG/100ML; MG/100ML; MG/100ML; MG/100ML
INJECTION, SOLUTION INTRAVENOUS CONTINUOUS PRN
Status: DISCONTINUED | OUTPATIENT
Start: 2021-01-19 | End: 2021-01-19

## 2021-01-19 RX ORDER — NICOTINE POLACRILEX 4 MG
30 LOZENGE BUCCAL
Status: DISCONTINUED | OUTPATIENT
Start: 2021-01-19 | End: 2021-01-19 | Stop reason: HOSPADM

## 2021-01-19 RX ORDER — CYCLOBENZAPRINE HCL 5 MG
5 TABLET ORAL 3 TIMES DAILY PRN
Status: DISCONTINUED | OUTPATIENT
Start: 2021-01-19 | End: 2021-01-19 | Stop reason: HOSPADM

## 2021-01-19 RX ORDER — ASPIRIN 325 MG
325 TABLET ORAL DAILY
Qty: 14 TABLET | Status: SHIPPED | COMMUNITY
Start: 2021-01-19 | End: 2022-04-12 | Stop reason: ALTCHOICE

## 2021-01-19 RX ORDER — HUMAN INSULIN 100 [IU]/ML
INJECTION, SOLUTION SUBCUTANEOUS
Status: DISCONTINUED | OUTPATIENT
Start: 2021-01-19 | End: 2021-01-19 | Stop reason: HOSPADM

## 2021-01-19 RX ORDER — BUPIVACAINE HYDROCHLORIDE AND EPINEPHRINE 2.5; 5 MG/ML; UG/ML
INJECTION, SOLUTION EPIDURAL; INFILTRATION; INTRACAUDAL; PERINEURAL PRN
Status: DISCONTINUED | OUTPATIENT
Start: 2021-01-19 | End: 2021-01-19 | Stop reason: HOSPADM

## 2021-01-19 RX ADMIN — SCOPOLAMINE 1 PATCH: 1 PATCH TRANSDERMAL at 08:59

## 2021-01-19 RX ADMIN — LABETALOL HYDROCHLORIDE 5 MG: 5 INJECTION, SOLUTION INTRAVENOUS at 12:36

## 2021-01-19 RX ADMIN — PROPOFOL 200 MG: 10 INJECTION, EMULSION INTRAVENOUS at 11:12

## 2021-01-19 RX ADMIN — FAMOTIDINE 20 MG: 20 TABLET ORAL at 08:58

## 2021-01-19 RX ADMIN — METOPROLOL TARTRATE 25 MG: 25 TABLET, FILM COATED ORAL at 08:58

## 2021-01-19 RX ADMIN — LABETALOL HYDROCHLORIDE 5 MG: 5 INJECTION, SOLUTION INTRAVENOUS at 12:56

## 2021-01-19 RX ADMIN — METOCLOPRAMIDE 10 MG: 10 TABLET ORAL at 08:58

## 2021-01-19 RX ADMIN — SODIUM CHLORIDE, POTASSIUM CHLORIDE, SODIUM LACTATE AND CALCIUM CHLORIDE: 600; 310; 30; 20 INJECTION, SOLUTION INTRAVENOUS at 11:02

## 2021-01-19 RX ADMIN — LABETALOL HYDROCHLORIDE 5 MG: 5 INJECTION, SOLUTION INTRAVENOUS at 12:26

## 2021-01-19 RX ADMIN — MIDAZOLAM HYDROCHLORIDE 2 MG: 1 INJECTION, SOLUTION INTRAMUSCULAR; INTRAVENOUS at 11:04

## 2021-01-19 RX ADMIN — DEXAMETHASONE SODIUM PHOSPHATE 4 MG: 4 INJECTION, SOLUTION INTRAMUSCULAR; INTRAVENOUS at 11:12

## 2021-01-19 RX ADMIN — CEFAZOLIN SODIUM 2 G: 1 INJECTION, POWDER, FOR SOLUTION INTRAMUSCULAR; INTRAVENOUS at 11:19

## 2021-01-19 RX ADMIN — FENTANYL CITRATE 100 MCG: 50 INJECTION, SOLUTION INTRAMUSCULAR; INTRAVENOUS at 11:11

## 2021-01-19 RX ADMIN — LABETALOL HYDROCHLORIDE 5 MG: 5 INJECTION, SOLUTION INTRAVENOUS at 12:46

## 2021-01-19 ASSESSMENT — PAIN SCALES - GENERAL
PAINLEVEL_OUTOF10: 3
PAINLEVEL_OUTOF10: 3
PAINLEVEL_OUTOF10: 0
PAINLEVEL_OUTOF10: 3

## 2021-02-01 ENCOUNTER — OFFICE VISIT (OUTPATIENT)
Dept: ORTHOPEDICS | Age: 61
End: 2021-02-01

## 2021-02-01 VITALS — BODY MASS INDEX: 41.3 KG/M2 | HEIGHT: 71 IN | WEIGHT: 295 LBS

## 2021-02-01 DIAGNOSIS — S83.242A ACUTE MEDIAL MENISCAL TEAR, LEFT, INITIAL ENCOUNTER: Primary | ICD-10-CM

## 2021-02-01 PROCEDURE — 99024 POSTOP FOLLOW-UP VISIT: CPT | Performed by: ORTHOPAEDIC SURGERY

## 2021-02-01 RX ORDER — MELOXICAM 15 MG/1
15 TABLET ORAL DAILY
Qty: 30 TABLET | Refills: 2 | Status: SHIPPED | OUTPATIENT
Start: 2021-02-01 | End: 2022-04-12 | Stop reason: ALTCHOICE

## 2021-02-04 ENCOUNTER — TELEPHONE (OUTPATIENT)
Dept: EDUCATION SERVICES | Age: 61
End: 2021-02-04

## 2021-02-04 ENCOUNTER — TELEPHONE (OUTPATIENT)
Dept: INTERNAL MEDICINE | Age: 61
End: 2021-02-04

## 2021-02-04 RX ORDER — METOPROLOL SUCCINATE 50 MG/1
50 TABLET, EXTENDED RELEASE ORAL AT BEDTIME
Qty: 30 TABLET | Refills: 6 | Status: SHIPPED | OUTPATIENT
Start: 2021-02-04 | End: 2021-09-24

## 2021-03-18 DIAGNOSIS — Z23 NEED FOR VACCINATION: ICD-10-CM

## 2021-03-22 ENCOUNTER — IMMUNIZATION (OUTPATIENT)
Dept: LAB | Age: 61
End: 2021-03-22

## 2021-03-22 DIAGNOSIS — Z23 NEED FOR VACCINATION: Primary | ICD-10-CM

## 2021-03-22 PROCEDURE — 91300 COVID 19 PFIZER-BIONTECH: CPT | Performed by: INTERNAL MEDICINE

## 2021-03-22 PROCEDURE — 0001A COVID 19 PFIZER-BIONTECH: CPT | Performed by: INTERNAL MEDICINE

## 2021-03-30 ENCOUNTER — LAB SERVICES (OUTPATIENT)
Dept: URGENT CARE | Age: 61
End: 2021-03-30

## 2021-03-30 DIAGNOSIS — Z01.812 ENCOUNTER FOR SCREENING LABORATORY TESTING FOR COVID-19 VIRUS IN ASYMPTOMATIC PATIENT: Primary | ICD-10-CM

## 2021-03-30 DIAGNOSIS — Z11.52 ENCOUNTER FOR SCREENING LABORATORY TESTING FOR COVID-19 VIRUS IN ASYMPTOMATIC PATIENT: Primary | ICD-10-CM

## 2021-03-30 LAB
SARS-COV-2 RNA RESP QL NAA+PROBE: NOT DETECTED
SERVICE CMNT-IMP: NORMAL
SERVICE CMNT-IMP: NORMAL

## 2021-03-30 PROCEDURE — U0003 INFECTIOUS AGENT DETECTION BY NUCLEIC ACID (DNA OR RNA); SEVERE ACUTE RESPIRATORY SYNDROME CORONAVIRUS 2 (SARS-COV-2) (CORONAVIRUS DISEASE [COVID-19]), AMPLIFIED PROBE TECHNIQUE, MAKING USE OF HIGH THROUGHPUT TECHNOLOGIES AS DESCRIBED BY CMS-2020-01-R: HCPCS | Performed by: INTERNAL MEDICINE

## 2021-03-30 PROCEDURE — U0005 INFEC AGEN DETEC AMPLI PROBE: HCPCS | Performed by: INTERNAL MEDICINE

## 2021-04-12 ENCOUNTER — IMMUNIZATION (OUTPATIENT)
Dept: LAB | Age: 61
End: 2021-04-12
Attending: PREVENTIVE MEDICINE

## 2021-04-12 ENCOUNTER — OFFICE VISIT (OUTPATIENT)
Dept: INTERNAL MEDICINE | Age: 61
End: 2021-04-12

## 2021-04-12 VITALS
HEART RATE: 101 BPM | DIASTOLIC BLOOD PRESSURE: 92 MMHG | BODY MASS INDEX: 43.2 KG/M2 | RESPIRATION RATE: 20 BRPM | WEIGHT: 308.6 LBS | TEMPERATURE: 98 F | SYSTOLIC BLOOD PRESSURE: 150 MMHG | HEIGHT: 71 IN

## 2021-04-12 DIAGNOSIS — Z23 NEED FOR VACCINATION: Primary | ICD-10-CM

## 2021-04-12 DIAGNOSIS — I10 ESSENTIAL HYPERTENSION: Primary | ICD-10-CM

## 2021-04-12 PROCEDURE — 91300 COVID 19 PFIZER-BIONTECH: CPT | Performed by: INTERNAL MEDICINE

## 2021-04-12 PROCEDURE — 99213 OFFICE O/P EST LOW 20 MIN: CPT | Performed by: INTERNAL MEDICINE

## 2021-04-12 PROCEDURE — 0002A COVID 19 PFIZER-BIONTECH: CPT | Performed by: INTERNAL MEDICINE

## 2021-04-12 RX ORDER — VALSARTAN 160 MG/1
160 TABLET ORAL DAILY
Qty: 30 TABLET | Refills: 11 | Status: SHIPPED | OUTPATIENT
Start: 2021-04-12 | End: 2022-04-12 | Stop reason: SDUPTHER

## 2021-05-25 VITALS
SYSTOLIC BLOOD PRESSURE: 160 MMHG | BODY MASS INDEX: 40.04 KG/M2 | RESPIRATION RATE: 19 BRPM | HEART RATE: 84 BPM | HEIGHT: 71 IN | OXYGEN SATURATION: 94 % | WEIGHT: 286 LBS | DIASTOLIC BLOOD PRESSURE: 79 MMHG | TEMPERATURE: 96.9 F

## 2021-06-10 ENCOUNTER — TELEPHONE (OUTPATIENT)
Dept: INTERNAL MEDICINE | Age: 61
End: 2021-06-10

## 2021-06-17 RX ORDER — LOSARTAN POTASSIUM 100 MG/1
TABLET ORAL
Qty: 90 TABLET | Refills: 1 | OUTPATIENT
Start: 2021-06-17

## 2021-08-02 ENCOUNTER — TELEPHONE (OUTPATIENT)
Dept: INTERNAL MEDICINE | Age: 61
End: 2021-08-02

## 2021-09-20 RX ORDER — LOSARTAN POTASSIUM 100 MG/1
TABLET ORAL
Qty: 90 TABLET | Refills: 1 | OUTPATIENT
Start: 2021-09-20

## 2021-09-24 RX ORDER — METOPROLOL SUCCINATE 50 MG/1
TABLET, EXTENDED RELEASE ORAL
Qty: 90 TABLET | Refills: 1 | Status: SHIPPED | OUTPATIENT
Start: 2021-09-24 | End: 2022-04-12 | Stop reason: SDUPTHER

## 2021-11-09 ENCOUNTER — TELEPHONE (OUTPATIENT)
Dept: INTERNAL MEDICINE | Age: 61
End: 2021-11-09

## 2021-11-11 ENCOUNTER — IMMUNIZATION (OUTPATIENT)
Dept: FAMILY MEDICINE | Age: 61
End: 2021-11-11

## 2021-11-11 DIAGNOSIS — Z23 NEED FOR COVID-19 VACCINE: Primary | ICD-10-CM

## 2021-11-11 DIAGNOSIS — Z23 NEED FOR VACCINATION: ICD-10-CM

## 2021-11-11 PROCEDURE — 90686 IIV4 VACC NO PRSV 0.5 ML IM: CPT | Performed by: INTERNAL MEDICINE

## 2021-11-11 PROCEDURE — 90471 IMMUNIZATION ADMIN: CPT | Performed by: INTERNAL MEDICINE

## 2021-11-11 PROCEDURE — 0004A COVID 19 PFIZER-BIONTECH: CPT | Performed by: INTERNAL MEDICINE

## 2021-11-11 PROCEDURE — 91300 COVID 19 PFIZER-BIONTECH: CPT | Performed by: INTERNAL MEDICINE

## 2022-01-20 ENCOUNTER — TELEPHONE (OUTPATIENT)
Dept: INTERNAL MEDICINE | Age: 62
End: 2022-01-20

## 2022-03-01 ENCOUNTER — TELEPHONE (OUTPATIENT)
Dept: INTERNAL MEDICINE | Age: 62
End: 2022-03-01

## 2022-04-12 ENCOUNTER — OFFICE VISIT (OUTPATIENT)
Dept: INTERNAL MEDICINE | Age: 62
End: 2022-04-12

## 2022-04-12 ENCOUNTER — LAB SERVICES (OUTPATIENT)
Dept: LAB | Age: 62
End: 2022-04-12

## 2022-04-12 VITALS
RESPIRATION RATE: 16 BRPM | HEIGHT: 71 IN | WEIGHT: 299.1 LBS | HEART RATE: 91 BPM | TEMPERATURE: 98 F | SYSTOLIC BLOOD PRESSURE: 160 MMHG | DIASTOLIC BLOOD PRESSURE: 80 MMHG | BODY MASS INDEX: 41.87 KG/M2

## 2022-04-12 DIAGNOSIS — E78.2 HYPERLIPIDEMIA, MIXED: ICD-10-CM

## 2022-04-12 DIAGNOSIS — I10 ESSENTIAL HYPERTENSION: ICD-10-CM

## 2022-04-12 DIAGNOSIS — R07.89 CHEST WALL PAIN: ICD-10-CM

## 2022-04-12 DIAGNOSIS — N64.4 BREAST PAIN, RIGHT: ICD-10-CM

## 2022-04-12 DIAGNOSIS — I10 ESSENTIAL HYPERTENSION: Primary | ICD-10-CM

## 2022-04-12 LAB
ALBUMIN SERPL-MCNC: 4.3 G/DL (ref 3.6–5.1)
ALBUMIN/GLOB SERPL: 1.3 {RATIO} (ref 1–2.4)
ALP SERPL-CCNC: 100 UNITS/L (ref 45–117)
ALT SERPL-CCNC: 61 UNITS/L
ANION GAP SERPL CALC-SCNC: 10 MMOL/L (ref 10–20)
AST SERPL-CCNC: 41 UNITS/L
BASOPHILS # BLD: 0.1 K/MCL (ref 0–0.3)
BASOPHILS NFR BLD: 1 %
BILIRUB SERPL-MCNC: 1 MG/DL (ref 0.2–1)
BUN SERPL-MCNC: 10 MG/DL (ref 6–20)
BUN/CREAT SERPL: 11 (ref 7–25)
CALCIUM SERPL-MCNC: 9.4 MG/DL (ref 8.4–10.2)
CHLORIDE SERPL-SCNC: 107 MMOL/L (ref 98–107)
CHOLEST SERPL-MCNC: 203 MG/DL
CHOLEST/HDLC SERPL: 5.6 {RATIO}
CO2 SERPL-SCNC: 27 MMOL/L (ref 21–32)
CREAT SERPL-MCNC: 0.9 MG/DL (ref 0.67–1.17)
DEPRECATED RDW RBC: 39.8 FL (ref 39–50)
EOSINOPHIL # BLD: 0.3 K/MCL (ref 0–0.5)
EOSINOPHIL NFR BLD: 3 %
ERYTHROCYTE [DISTWIDTH] IN BLOOD: 12.7 % (ref 11–15)
FASTING DURATION TIME PATIENT: 12 HOURS (ref 0–999)
FASTING DURATION TIME PATIENT: 12 HOURS (ref 0–999)
GFR SERPLBLD BASED ON 1.73 SQ M-ARVRAT: >90 ML/MIN
GLOBULIN SER-MCNC: 3.4 G/DL (ref 2–4)
GLUCOSE SERPL-MCNC: 106 MG/DL (ref 70–99)
HCT VFR BLD CALC: 48.8 % (ref 39–51)
HDLC SERPL-MCNC: 36 MG/DL
HGB BLD-MCNC: 16.3 G/DL (ref 13–17)
IMM GRANULOCYTES # BLD AUTO: 0.1 K/MCL (ref 0–0.2)
IMM GRANULOCYTES # BLD: 1 %
LDLC SERPL CALC-MCNC: 129 MG/DL
LYMPHOCYTES # BLD: 2.3 K/MCL (ref 1–4)
LYMPHOCYTES NFR BLD: 25 %
MCH RBC QN AUTO: 29 PG (ref 26–34)
MCHC RBC AUTO-ENTMCNC: 33.4 G/DL (ref 32–36.5)
MCV RBC AUTO: 86.7 FL (ref 78–100)
MONOCYTES # BLD: 0.9 K/MCL (ref 0.3–0.9)
MONOCYTES NFR BLD: 10 %
NEUTROPHILS # BLD: 5.4 K/MCL (ref 1.8–7.7)
NEUTROPHILS NFR BLD: 60 %
NONHDLC SERPL-MCNC: 167 MG/DL
NRBC BLD MANUAL-RTO: 0 /100 WBC
PLATELET # BLD AUTO: 303 K/MCL (ref 140–450)
POTASSIUM SERPL-SCNC: 4 MMOL/L (ref 3.4–5.1)
PROT SERPL-MCNC: 7.7 G/DL (ref 6.4–8.2)
RBC # BLD: 5.63 MIL/MCL (ref 4.5–5.9)
SODIUM SERPL-SCNC: 140 MMOL/L (ref 135–145)
TRIGL SERPL-MCNC: 189 MG/DL
WBC # BLD: 9.1 K/MCL (ref 4.2–11)

## 2022-04-12 PROCEDURE — 80061 LIPID PANEL: CPT | Performed by: INTERNAL MEDICINE

## 2022-04-12 PROCEDURE — 80053 COMPREHEN METABOLIC PANEL: CPT | Performed by: INTERNAL MEDICINE

## 2022-04-12 PROCEDURE — 36415 COLL VENOUS BLD VENIPUNCTURE: CPT | Performed by: INTERNAL MEDICINE

## 2022-04-12 PROCEDURE — 99214 OFFICE O/P EST MOD 30 MIN: CPT | Performed by: INTERNAL MEDICINE

## 2022-04-12 PROCEDURE — 85025 COMPLETE CBC W/AUTO DIFF WBC: CPT | Performed by: INTERNAL MEDICINE

## 2022-04-12 RX ORDER — METOPROLOL SUCCINATE 50 MG/1
50 TABLET, EXTENDED RELEASE ORAL AT BEDTIME
Qty: 90 TABLET | Refills: 1 | Status: SHIPPED | OUTPATIENT
Start: 2022-04-12 | End: 2023-05-09 | Stop reason: SDUPTHER

## 2022-04-12 RX ORDER — VALSARTAN 320 MG/1
320 TABLET ORAL DAILY
Qty: 90 TABLET | Refills: 3 | Status: SHIPPED | OUTPATIENT
Start: 2022-04-12

## 2022-04-12 ASSESSMENT — PATIENT HEALTH QUESTIONNAIRE - PHQ9
CLINICAL INTERPRETATION OF PHQ2 SCORE: NO FURTHER SCREENING NEEDED
1. LITTLE INTEREST OR PLEASURE IN DOING THINGS: NOT AT ALL
2. FEELING DOWN, DEPRESSED OR HOPELESS: NOT AT ALL
SUM OF ALL RESPONSES TO PHQ9 QUESTIONS 1 AND 2: 0
SUM OF ALL RESPONSES TO PHQ9 QUESTIONS 1 AND 2: 0

## 2022-04-19 ENCOUNTER — APPOINTMENT (OUTPATIENT)
Dept: ULTRASOUND IMAGING | Age: 62
End: 2022-04-19
Attending: INTERNAL MEDICINE

## 2022-04-19 ENCOUNTER — APPOINTMENT (OUTPATIENT)
Dept: MAMMOGRAPHY | Age: 62
End: 2022-04-19
Attending: INTERNAL MEDICINE

## 2022-05-12 ENCOUNTER — APPOINTMENT (OUTPATIENT)
Dept: INTERNAL MEDICINE | Age: 62
End: 2022-05-12

## 2022-08-05 ENCOUNTER — TELEPHONE (OUTPATIENT)
Dept: INTERNAL MEDICINE | Age: 62
End: 2022-08-05

## 2022-08-15 ENCOUNTER — APPOINTMENT (OUTPATIENT)
Dept: INTERNAL MEDICINE | Age: 62
End: 2022-08-15

## 2022-11-15 ENCOUNTER — TELEPHONE (OUTPATIENT)
Dept: INTERNAL MEDICINE | Age: 62
End: 2022-11-15

## 2023-03-15 ENCOUNTER — TELEPHONE (OUTPATIENT)
Dept: INTERNAL MEDICINE | Age: 63
End: 2023-03-15

## 2023-05-09 ENCOUNTER — OFFICE VISIT (OUTPATIENT)
Dept: INTERNAL MEDICINE | Age: 63
End: 2023-05-09

## 2023-05-09 VITALS
SYSTOLIC BLOOD PRESSURE: 140 MMHG | HEART RATE: 86 BPM | DIASTOLIC BLOOD PRESSURE: 100 MMHG | HEIGHT: 71 IN | BODY MASS INDEX: 42.34 KG/M2 | TEMPERATURE: 97.2 F | RESPIRATION RATE: 18 BRPM | WEIGHT: 302.4 LBS

## 2023-05-09 DIAGNOSIS — Z86.018 HISTORY OF CHANGING SKIN MOLE: Primary | ICD-10-CM

## 2023-05-09 DIAGNOSIS — I10 ESSENTIAL HYPERTENSION: ICD-10-CM

## 2023-05-09 PROCEDURE — 99213 OFFICE O/P EST LOW 20 MIN: CPT | Performed by: INTERNAL MEDICINE

## 2023-05-09 RX ORDER — ATENOLOL 50 MG/1
50 TABLET ORAL DAILY
Qty: 30 TABLET | Refills: 5 | Status: SHIPPED | OUTPATIENT
Start: 2023-05-09

## 2023-05-11 RX ORDER — METOPROLOL SUCCINATE 50 MG/1
50 TABLET, EXTENDED RELEASE ORAL AT BEDTIME
Qty: 90 TABLET | Refills: 1 | OUTPATIENT
Start: 2023-05-11

## 2023-05-17 ENCOUNTER — TELEPHONE (OUTPATIENT)
Dept: INTERNAL MEDICINE | Age: 63
End: 2023-05-17

## 2023-08-17 ENCOUNTER — TELEPHONE (OUTPATIENT)
Dept: INTERNAL MEDICINE | Age: 63
End: 2023-08-17

## 2023-08-28 ENCOUNTER — TELEPHONE (OUTPATIENT)
Dept: INTERNAL MEDICINE | Age: 63
End: 2023-08-28

## 2023-12-08 ENCOUNTER — TELEPHONE (OUTPATIENT)
Dept: INTERNAL MEDICINE | Age: 63
End: 2023-12-08

## 2023-12-19 ENCOUNTER — TELEPHONE (OUTPATIENT)
Dept: INTERNAL MEDICINE | Age: 63
End: 2023-12-19

## 2024-01-24 ENCOUNTER — APPOINTMENT (OUTPATIENT)
Dept: FAMILY MEDICINE | Age: 64
End: 2024-01-24

## 2024-01-24 VITALS
SYSTOLIC BLOOD PRESSURE: 160 MMHG | HEART RATE: 82 BPM | OXYGEN SATURATION: 98 % | TEMPERATURE: 98.7 F | HEIGHT: 71 IN | BODY MASS INDEX: 42.18 KG/M2 | DIASTOLIC BLOOD PRESSURE: 110 MMHG | RESPIRATION RATE: 18 BRPM

## 2024-01-24 DIAGNOSIS — M25.561 ACUTE PAIN OF RIGHT KNEE: Primary | ICD-10-CM

## 2024-01-24 PROCEDURE — 99203 OFFICE O/P NEW LOW 30 MIN: CPT | Performed by: STUDENT IN AN ORGANIZED HEALTH CARE EDUCATION/TRAINING PROGRAM

## 2024-01-24 ASSESSMENT — PATIENT HEALTH QUESTIONNAIRE - PHQ9
1. LITTLE INTEREST OR PLEASURE IN DOING THINGS: NOT AT ALL
CLINICAL INTERPRETATION OF PHQ2 SCORE: NO FURTHER SCREENING NEEDED
2. FEELING DOWN, DEPRESSED OR HOPELESS: NOT AT ALL
SUM OF ALL RESPONSES TO PHQ9 QUESTIONS 1 AND 2: 0
SUM OF ALL RESPONSES TO PHQ9 QUESTIONS 1 AND 2: 0

## 2024-01-29 ASSESSMENT — PATIENT HEALTH QUESTIONNAIRE - PHQ9
SUM OF ALL RESPONSES TO PHQ9 QUESTIONS 1 AND 2: 0
1. LITTLE INTEREST OR PLEASURE IN DOING THINGS: NOT AT ALL
CLINICAL INTERPRETATION OF PHQ2 SCORE: NO FURTHER SCREENING NEEDED
2. FEELING DOWN, DEPRESSED OR HOPELESS: NOT AT ALL
CLINICAL INTERPRETATION OF PHQ2 SCORE: 0

## 2024-01-31 ENCOUNTER — APPOINTMENT (OUTPATIENT)
Dept: ORTHOPEDICS | Age: 64
End: 2024-01-31

## 2024-01-31 DIAGNOSIS — M23.91 INTERNAL DERANGEMENT OF RIGHT KNEE: Primary | ICD-10-CM

## 2024-01-31 DIAGNOSIS — M25.561 RIGHT KNEE PAIN, UNSPECIFIED CHRONICITY: ICD-10-CM

## 2024-01-31 PROCEDURE — 99213 OFFICE O/P EST LOW 20 MIN: CPT | Performed by: ORTHOPAEDIC SURGERY

## 2024-01-31 PROCEDURE — 20610 DRAIN/INJ JOINT/BURSA W/O US: CPT | Performed by: ORTHOPAEDIC SURGERY

## 2024-01-31 RX ORDER — TRIAMCINOLONE ACETONIDE 40 MG/ML
80 INJECTION, SUSPENSION INTRA-ARTICULAR; INTRAMUSCULAR ONCE
Status: COMPLETED | OUTPATIENT
Start: 2024-01-31 | End: 2024-01-31

## 2024-01-31 RX ADMIN — TRIAMCINOLONE ACETONIDE 80 MG: 40 INJECTION, SUSPENSION INTRA-ARTICULAR; INTRAMUSCULAR at 11:31

## 2024-02-05 ENCOUNTER — APPOINTMENT (OUTPATIENT)
Dept: LAB | Age: 64
End: 2024-02-05

## 2024-02-05 ENCOUNTER — APPOINTMENT (OUTPATIENT)
Dept: INTERNAL MEDICINE | Age: 64
End: 2024-02-05

## 2024-02-05 VITALS
DIASTOLIC BLOOD PRESSURE: 100 MMHG | BODY MASS INDEX: 43.88 KG/M2 | HEIGHT: 71 IN | SYSTOLIC BLOOD PRESSURE: 156 MMHG | OXYGEN SATURATION: 98 % | WEIGHT: 313.4 LBS | HEART RATE: 75 BPM

## 2024-02-05 DIAGNOSIS — I10 ESSENTIAL HYPERTENSION: Primary | ICD-10-CM

## 2024-02-05 DIAGNOSIS — Z00.00 MEDICARE WELCOME VISIT: ICD-10-CM

## 2024-02-05 DIAGNOSIS — Z13.6 SCREENING FOR CARDIOVASCULAR CONDITION: ICD-10-CM

## 2024-02-05 DIAGNOSIS — R73.9 HYPERGLYCEMIA: ICD-10-CM

## 2024-02-05 DIAGNOSIS — Z12.5 SCREENING FOR PROSTATE CANCER: ICD-10-CM

## 2024-02-05 DIAGNOSIS — Z23 NEED FOR VACCINATION: ICD-10-CM

## 2024-02-05 DIAGNOSIS — E78.2 HYPERLIPIDEMIA, MIXED: ICD-10-CM

## 2024-02-05 DIAGNOSIS — G47.33 OSA ON CPAP: ICD-10-CM

## 2024-02-05 PROBLEM — Z86.19 HISTORY OF HEPATITIS C: Status: ACTIVE | Noted: 2024-02-05

## 2024-02-05 PROBLEM — B18.2 CHRONIC HEPATITIS C WITHOUT HEPATIC COMA  (CMD): Status: RESOLVED | Noted: 2018-07-27 | Resolved: 2024-02-05

## 2024-02-05 PROCEDURE — 36415 COLL VENOUS BLD VENIPUNCTURE: CPT | Performed by: INTERNAL MEDICINE

## 2024-02-05 PROCEDURE — G0009 ADMIN PNEUMOCOCCAL VACCINE: HCPCS | Performed by: INTERNAL MEDICINE

## 2024-02-05 PROCEDURE — 90677 PCV20 VACCINE IM: CPT | Performed by: INTERNAL MEDICINE

## 2024-02-05 RX ORDER — VALSARTAN 320 MG/1
320 TABLET ORAL DAILY
Qty: 90 TABLET | Refills: 3 | Status: SHIPPED | OUTPATIENT
Start: 2024-02-05

## 2024-02-05 RX ORDER — CHLORTHALIDONE 25 MG/1
12.5 TABLET ORAL DAILY
Qty: 45 TABLET | Refills: 1 | Status: SHIPPED | OUTPATIENT
Start: 2024-02-05

## 2024-02-06 ENCOUNTER — E-ADVICE (OUTPATIENT)
Dept: INTERNAL MEDICINE | Age: 64
End: 2024-02-06

## 2024-02-06 DIAGNOSIS — R97.20 ELEVATED PSA: Primary | ICD-10-CM

## 2024-02-06 PROBLEM — R73.03 PREDIABETES: Status: ACTIVE | Noted: 2024-02-06

## 2024-02-06 LAB
ALBUMIN SERPL-MCNC: 4.1 G/DL (ref 3.6–5.1)
ALBUMIN/GLOB SERPL: 1.4 {RATIO} (ref 1–2.4)
ALP SERPL-CCNC: 112 UNITS/L (ref 45–117)
ALT SERPL-CCNC: 36 UNITS/L
ANION GAP SERPL CALC-SCNC: 8 MMOL/L (ref 7–19)
AST SERPL-CCNC: 17 UNITS/L
BASOPHILS # BLD: 0.1 K/MCL (ref 0–0.3)
BASOPHILS NFR BLD: 0 %
BILIRUB SERPL-MCNC: 0.7 MG/DL (ref 0.2–1)
BUN SERPL-MCNC: 15 MG/DL (ref 6–20)
BUN/CREAT SERPL: 18 (ref 7–25)
CALCIUM SERPL-MCNC: 9.3 MG/DL (ref 8.4–10.2)
CHLORIDE SERPL-SCNC: 108 MMOL/L (ref 97–110)
CHOLEST SERPL-MCNC: 203 MG/DL
CHOLEST/HDLC SERPL: 4.8 {RATIO}
CO2 SERPL-SCNC: 30 MMOL/L (ref 21–32)
CREAT SERPL-MCNC: 0.82 MG/DL (ref 0.67–1.17)
DEPRECATED RDW RBC: 41.5 FL (ref 39–50)
EGFRCR SERPLBLD CKD-EPI 2021: >90 ML/MIN/{1.73_M2}
EOSINOPHIL # BLD: 0.1 K/MCL (ref 0–0.5)
EOSINOPHIL NFR BLD: 1 %
ERYTHROCYTE [DISTWIDTH] IN BLOOD: 13.2 % (ref 11–15)
FASTING DURATION TIME PATIENT: 0 HOURS (ref 0–999)
GLOBULIN SER-MCNC: 3 G/DL (ref 2–4)
GLUCOSE SERPL-MCNC: 120 MG/DL (ref 70–99)
HBA1C MFR BLD: 5.9 % (ref 4.5–5.6)
HCT VFR BLD CALC: 44.7 % (ref 39–51)
HDLC SERPL-MCNC: 42 MG/DL
HGB BLD-MCNC: 15 G/DL (ref 13–17)
IMM GRANULOCYTES # BLD AUTO: 0.2 K/MCL (ref 0–0.2)
IMM GRANULOCYTES # BLD: 1 %
LDLC SERPL CALC-MCNC: 132 MG/DL
LYMPHOCYTES # BLD: 3.3 K/MCL (ref 1–4)
LYMPHOCYTES NFR BLD: 23 %
MCH RBC QN AUTO: 29.2 PG (ref 26–34)
MCHC RBC AUTO-ENTMCNC: 33.6 G/DL (ref 32–36.5)
MCV RBC AUTO: 87 FL (ref 78–100)
MONOCYTES # BLD: 1.3 K/MCL (ref 0.3–0.9)
MONOCYTES NFR BLD: 9 %
NEUTROPHILS # BLD: 9.5 K/MCL (ref 1.8–7.7)
NEUTROPHILS NFR BLD: 66 %
NONHDLC SERPL-MCNC: 161 MG/DL
NRBC BLD MANUAL-RTO: 0 /100 WBC
PLATELET # BLD AUTO: 280 K/MCL (ref 140–450)
POTASSIUM SERPL-SCNC: 3.9 MMOL/L (ref 3.4–5.1)
PROT SERPL-MCNC: 7.1 G/DL (ref 6.4–8.2)
PSA SERPL-MCNC: 5.54 NG/ML
RBC # BLD: 5.14 MIL/MCL (ref 4.5–5.9)
SODIUM SERPL-SCNC: 142 MMOL/L (ref 135–145)
TRIGL SERPL-MCNC: 143 MG/DL
WBC # BLD: 14.5 K/MCL (ref 4.2–11)

## 2024-03-06 ENCOUNTER — TELEPHONE (OUTPATIENT)
Dept: INTERNAL MEDICINE | Age: 64
End: 2024-03-06

## 2024-03-12 ENCOUNTER — APPOINTMENT (OUTPATIENT)
Dept: DERMATOLOGY | Age: 64
End: 2024-03-12
Attending: INTERNAL MEDICINE

## 2024-03-12 DIAGNOSIS — L82.1 SEBORRHEIC KERATOSIS: Primary | ICD-10-CM

## 2024-03-12 DIAGNOSIS — L57.0 AK (ACTINIC KERATOSIS): ICD-10-CM

## 2024-03-12 DIAGNOSIS — L91.8 SKIN TAG: ICD-10-CM

## 2024-03-12 DIAGNOSIS — D22.9 MULTIPLE BENIGN NEVI: ICD-10-CM

## 2024-03-12 PROCEDURE — 99203 OFFICE O/P NEW LOW 30 MIN: CPT | Performed by: STUDENT IN AN ORGANIZED HEALTH CARE EDUCATION/TRAINING PROGRAM

## 2024-03-12 PROCEDURE — 17000 DESTRUCT PREMALG LESION: CPT | Performed by: STUDENT IN AN ORGANIZED HEALTH CARE EDUCATION/TRAINING PROGRAM

## 2024-03-14 ENCOUNTER — LAB SERVICES (OUTPATIENT)
Dept: LAB | Age: 64
End: 2024-03-14

## 2024-03-14 ENCOUNTER — APPOINTMENT (OUTPATIENT)
Dept: UROLOGY | Age: 64
End: 2024-03-14

## 2024-03-14 VITALS — OXYGEN SATURATION: 98 % | DIASTOLIC BLOOD PRESSURE: 95 MMHG | SYSTOLIC BLOOD PRESSURE: 165 MMHG | HEART RATE: 101 BPM

## 2024-03-14 DIAGNOSIS — R97.20 ELEVATED PSA: Primary | ICD-10-CM

## 2024-03-14 DIAGNOSIS — Z80.42 FAMILY HISTORY OF PROSTATE CANCER: ICD-10-CM

## 2024-03-14 DIAGNOSIS — R97.20 ELEVATED PSA: ICD-10-CM

## 2024-03-14 DIAGNOSIS — R39.9 LOWER URINARY TRACT SYMPTOMS (LUTS): ICD-10-CM

## 2024-03-14 LAB
APPEARANCE, POC: CLEAR
BILIRUBIN, POC: NEGATIVE
COLOR, POC: YELLOW
GLUCOSE UR-MCNC: NEGATIVE MG/DL
KETONES, POC: NEGATIVE MG/DL
NITRITE, POC: NEGATIVE
OCCULT BLOOD, POC: NEGATIVE
PH UR: 6 [PH] (ref 5–7)
PROT UR-MCNC: ABNORMAL MG/DL
SP GR UR: 1.02 (ref 1–1.03)
UROBILINOGEN UR-MCNC: 0.2 MG/DL (ref 0–1)
WBC (LEUKOCYTE) ESTERASE, POC: NEGATIVE

## 2024-03-14 PROCEDURE — 36415 COLL VENOUS BLD VENIPUNCTURE: CPT | Performed by: INTERNAL MEDICINE

## 2024-03-14 PROCEDURE — 99204 OFFICE O/P NEW MOD 45 MIN: CPT | Performed by: STUDENT IN AN ORGANIZED HEALTH CARE EDUCATION/TRAINING PROGRAM

## 2024-03-14 PROCEDURE — 84153 ASSAY OF PSA TOTAL: CPT | Performed by: CLINICAL MEDICAL LABORATORY

## 2024-03-15 LAB — PSA SERPL-MCNC: 6.99 NG/ML

## 2024-03-18 ENCOUNTER — TELEPHONE (OUTPATIENT)
Dept: UROLOGY | Age: 64
End: 2024-03-18

## 2024-03-18 DIAGNOSIS — R97.20 ELEVATED PSA: Primary | ICD-10-CM

## 2024-03-27 ENCOUNTER — TELEPHONE (OUTPATIENT)
Dept: UROLOGY | Age: 64
End: 2024-03-27

## 2024-06-24 ENCOUNTER — LAB SERVICES (OUTPATIENT)
Dept: LAB | Age: 64
End: 2024-06-24

## 2024-06-24 DIAGNOSIS — R97.20 ELEVATED PSA: ICD-10-CM

## 2024-06-24 LAB — PSA SERPL-MCNC: 5.59 NG/ML

## 2024-06-24 PROCEDURE — 84153 ASSAY OF PSA TOTAL: CPT | Performed by: CLINICAL MEDICAL LABORATORY

## 2024-06-24 PROCEDURE — 36415 COLL VENOUS BLD VENIPUNCTURE: CPT | Performed by: INTERNAL MEDICINE

## 2024-06-27 ENCOUNTER — TELEPHONE (OUTPATIENT)
Dept: UROLOGY | Age: 64
End: 2024-06-27

## 2024-06-27 ENCOUNTER — APPOINTMENT (OUTPATIENT)
Dept: UROLOGY | Age: 64
End: 2024-06-27

## 2024-06-27 VITALS
DIASTOLIC BLOOD PRESSURE: 103 MMHG | HEART RATE: 88 BPM | HEIGHT: 71 IN | BODY MASS INDEX: 43.71 KG/M2 | SYSTOLIC BLOOD PRESSURE: 187 MMHG | OXYGEN SATURATION: 97 %

## 2024-06-27 DIAGNOSIS — R97.20 ELEVATED PSA: Primary | ICD-10-CM

## 2024-06-27 PROCEDURE — 99213 OFFICE O/P EST LOW 20 MIN: CPT | Performed by: STUDENT IN AN ORGANIZED HEALTH CARE EDUCATION/TRAINING PROGRAM

## 2024-07-05 ENCOUNTER — PREP FOR CASE (OUTPATIENT)
Dept: UROLOGY | Age: 64
End: 2024-07-05

## 2024-07-05 DIAGNOSIS — R97.20 ELEVATED PSA: Primary | ICD-10-CM

## 2024-08-13 RX ORDER — CHLORTHALIDONE 25 MG/1
12.5 TABLET ORAL DAILY
Qty: 45 TABLET | Refills: 0 | Status: SHIPPED | OUTPATIENT
Start: 2024-08-13

## 2024-08-14 ENCOUNTER — HOSPITAL ENCOUNTER (OUTPATIENT)
Age: 64
Discharge: HOME | End: 2024-08-14
Attending: STUDENT IN AN ORGANIZED HEALTH CARE EDUCATION/TRAINING PROGRAM | Admitting: STUDENT IN AN ORGANIZED HEALTH CARE EDUCATION/TRAINING PROGRAM

## 2024-08-14 VITALS
DIASTOLIC BLOOD PRESSURE: 82 MMHG | RESPIRATION RATE: 16 BRPM | SYSTOLIC BLOOD PRESSURE: 164 MMHG | OXYGEN SATURATION: 97 % | HEART RATE: 82 BPM | TEMPERATURE: 97.1 F

## 2024-08-14 DIAGNOSIS — R97.20 ELEVATED PSA: ICD-10-CM

## 2024-08-14 PROCEDURE — 10006390 HB BASIC PROCEDURE: Performed by: STUDENT IN AN ORGANIZED HEALTH CARE EDUCATION/TRAINING PROGRAM

## 2024-08-14 PROCEDURE — 10004185 HB COUNTER-VISIT  CENSUS

## 2024-08-14 PROCEDURE — 10006023 HB SUPPLY 272: Performed by: STUDENT IN AN ORGANIZED HEALTH CARE EDUCATION/TRAINING PROGRAM

## 2024-08-14 PROCEDURE — G0416 PROSTATE BIOPSY, ANY MTHD: HCPCS | Performed by: STUDENT IN AN ORGANIZED HEALTH CARE EDUCATION/TRAINING PROGRAM

## 2024-08-14 PROCEDURE — 10002801 HB RX 250 W/O HCPCS: Performed by: STUDENT IN AN ORGANIZED HEALTH CARE EDUCATION/TRAINING PROGRAM

## 2024-08-14 RX ORDER — LIDOCAINE HYDROCHLORIDE 20 MG/ML
JELLY TOPICAL PRN
Status: DISCONTINUED | OUTPATIENT
Start: 2024-08-14 | End: 2024-08-14 | Stop reason: HOSPADM

## 2024-08-14 RX ORDER — LIDOCAINE HYDROCHLORIDE 10 MG/ML
INJECTION, SOLUTION INFILTRATION; PERINEURAL PRN
Status: DISCONTINUED | OUTPATIENT
Start: 2024-08-14 | End: 2024-08-14 | Stop reason: HOSPADM

## 2024-08-15 LAB
ASR DISCLAIMER: NORMAL
CASE RPRT: NORMAL
CLINICAL INFO: NORMAL
PATH REPORT.FINAL DX SPEC: NORMAL
PATH REPORT.GROSS SPEC: NORMAL
PATH REPORT.MICROSCOPIC SPEC OTHER STN: NORMAL

## 2024-08-19 ENCOUNTER — TELEPHONE (OUTPATIENT)
Dept: INTERNAL MEDICINE | Age: 64
End: 2024-08-19

## 2024-08-26 ENCOUNTER — TELEPHONE (OUTPATIENT)
Dept: INTERNAL MEDICINE | Age: 64
End: 2024-08-26

## 2024-08-29 ENCOUNTER — APPOINTMENT (OUTPATIENT)
Dept: UROLOGY | Age: 64
End: 2024-08-29

## 2024-08-29 ENCOUNTER — TELEPHONE (OUTPATIENT)
Dept: UROLOGY | Age: 64
End: 2024-08-29

## 2024-08-29 DIAGNOSIS — R39.9 LOWER URINARY TRACT SYMPTOMS (LUTS): ICD-10-CM

## 2024-08-29 DIAGNOSIS — R97.20 ELEVATED PSA: Primary | ICD-10-CM

## 2024-08-29 PROCEDURE — 99214 OFFICE O/P EST MOD 30 MIN: CPT | Performed by: STUDENT IN AN ORGANIZED HEALTH CARE EDUCATION/TRAINING PROGRAM

## 2024-10-16 ENCOUNTER — E-ADVICE (OUTPATIENT)
Dept: INTERNAL MEDICINE | Age: 64
End: 2024-10-16

## 2024-10-16 RX ORDER — CHLORTHALIDONE 25 MG/1
12.5 TABLET ORAL DAILY
Qty: 45 TABLET | Refills: 1 | Status: SHIPPED | OUTPATIENT
Start: 2024-10-16

## 2025-02-06 RX ORDER — VALSARTAN 320 MG/1
320 TABLET ORAL DAILY
Qty: 90 TABLET | Refills: 0 | Status: SHIPPED | OUTPATIENT
Start: 2025-02-06

## 2025-04-03 ENCOUNTER — TELEPHONE (OUTPATIENT)
Dept: INTERNAL MEDICINE | Age: 65
End: 2025-04-03

## 2025-04-30 SDOH — ECONOMIC STABILITY: FOOD INSECURITY: WITHIN THE PAST 12 MONTHS, THE FOOD YOU BOUGHT JUST DIDN'T LAST AND YOU DIDN'T HAVE MONEY TO GET MORE.: NEVER TRUE

## 2025-04-30 SDOH — ECONOMIC STABILITY: TRANSPORTATION INSECURITY
IN THE PAST 12 MONTHS, HAS LACK OF RELIABLE TRANSPORTATION KEPT YOU FROM MEDICAL APPOINTMENTS, MEETINGS, WORK OR FROM GETTING THINGS NEEDED FOR DAILY LIVING?: NO

## 2025-04-30 SDOH — ECONOMIC STABILITY: HOUSING INSECURITY: DO YOU HAVE PROBLEMS WITH ANY OF THE FOLLOWING?: NONE OF THE ABOVE

## 2025-04-30 SDOH — ECONOMIC STABILITY: HOUSING INSECURITY: WHAT IS YOUR LIVING SITUATION TODAY?: I HAVE A STEADY PLACE TO LIVE

## 2025-04-30 ASSESSMENT — SOCIAL DETERMINANTS OF HEALTH (SDOH): IN THE PAST 12 MONTHS, HAS THE ELECTRIC, GAS, OIL, OR WATER COMPANY THREATENED TO SHUT OFF SERVICE IN YOUR HOME?: NO

## 2025-05-07 ENCOUNTER — APPOINTMENT (OUTPATIENT)
Dept: LAB | Age: 65
End: 2025-05-07

## 2025-05-07 ENCOUNTER — APPOINTMENT (OUTPATIENT)
Dept: INTERNAL MEDICINE | Age: 65
End: 2025-05-07

## 2025-05-07 VITALS
HEIGHT: 71 IN | WEIGHT: 289.3 LBS | SYSTOLIC BLOOD PRESSURE: 140 MMHG | DIASTOLIC BLOOD PRESSURE: 86 MMHG | OXYGEN SATURATION: 98 % | BODY MASS INDEX: 40.5 KG/M2 | RESPIRATION RATE: 16 BRPM | HEART RATE: 90 BPM

## 2025-05-07 DIAGNOSIS — G47.33 OSA ON CPAP: ICD-10-CM

## 2025-05-07 DIAGNOSIS — H53.9 CHANGES IN VISION: ICD-10-CM

## 2025-05-07 DIAGNOSIS — R97.20 ELEVATED PSA: ICD-10-CM

## 2025-05-07 DIAGNOSIS — I10 ESSENTIAL HYPERTENSION: Primary | ICD-10-CM

## 2025-05-07 DIAGNOSIS — D72.829 LEUKOCYTOSIS, UNSPECIFIED TYPE: ICD-10-CM

## 2025-05-07 DIAGNOSIS — E66.813 CLASS 3 SEVERE OBESITY DUE TO EXCESS CALORIES IN ADULT: ICD-10-CM

## 2025-05-07 DIAGNOSIS — E78.2 HYPERLIPIDEMIA, MIXED: ICD-10-CM

## 2025-05-07 DIAGNOSIS — R73.03 PREDIABETES: ICD-10-CM

## 2025-05-07 LAB
ALBUMIN SERPL-MCNC: 4 G/DL (ref 3.4–5)
ALBUMIN/GLOB SERPL: 1.3 {RATIO} (ref 1–2.4)
ALP SERPL-CCNC: 84 UNITS/L (ref 45–117)
ALT SERPL-CCNC: 51 UNITS/L
ANION GAP SERPL CALC-SCNC: 8 MMOL/L (ref 7–19)
AST SERPL-CCNC: 31 UNITS/L
BASOPHILS # BLD: 0 K/MCL (ref 0–0.3)
BASOPHILS NFR BLD: 0 %
BILIRUB SERPL-MCNC: 0.8 MG/DL (ref 0.2–1)
BUN SERPL-MCNC: 14 MG/DL (ref 6–20)
BUN/CREAT SERPL: 15 (ref 7–25)
CALCIUM SERPL-MCNC: 9.2 MG/DL (ref 8.4–10.2)
CHLORIDE SERPL-SCNC: 105 MMOL/L (ref 97–110)
CHOLEST SERPL-MCNC: 142 MG/DL
CHOLEST/HDLC SERPL: 4.9 {RATIO}
CO2 SERPL-SCNC: 30 MMOL/L (ref 21–32)
CREAT SERPL-MCNC: 0.91 MG/DL (ref 0.67–1.17)
DEPRECATED RDW RBC: 40.2 FL (ref 39–50)
EGFRCR SERPLBLD CKD-EPI 2021: >90 ML/MIN/{1.73_M2}
EOSINOPHIL # BLD: 0.2 K/MCL (ref 0–0.5)
EOSINOPHIL NFR BLD: 2 %
ERYTHROCYTE [DISTWIDTH] IN BLOOD: 13 % (ref 11–15)
FASTING DURATION TIME PATIENT: 16 HOURS (ref 0–999)
GLOBULIN SER-MCNC: 3.1 G/DL (ref 2–4)
GLUCOSE SERPL-MCNC: 108 MG/DL (ref 70–99)
HBA1C MFR BLD: 5.5 % (ref 4.5–5.6)
HCT VFR BLD CALC: 45.5 % (ref 39–51)
HDLC SERPL-MCNC: 29 MG/DL
HGB BLD-MCNC: 15 G/DL (ref 13–17)
IMM GRANULOCYTES # BLD AUTO: 0.1 K/MCL (ref 0–0.2)
IMM GRANULOCYTES # BLD: 1 %
LDLC SERPL CALC-MCNC: 87 MG/DL
LYMPHOCYTES # BLD: 2.6 K/MCL (ref 1–4)
LYMPHOCYTES NFR BLD: 24 %
MCH RBC QN AUTO: 28.3 PG (ref 26–34)
MCHC RBC AUTO-ENTMCNC: 33 G/DL (ref 32–36.5)
MCV RBC AUTO: 85.8 FL (ref 78–100)
MONOCYTES # BLD: 0.9 K/MCL (ref 0.3–0.9)
MONOCYTES NFR BLD: 9 %
NEUTROPHILS # BLD: 6.9 K/MCL (ref 1.8–7.7)
NEUTROPHILS NFR BLD: 64 %
NONHDLC SERPL-MCNC: 113 MG/DL
NRBC BLD MANUAL-RTO: 0 /100 WBC
PLATELET # BLD AUTO: 338 K/MCL (ref 140–450)
POTASSIUM SERPL-SCNC: 3.3 MMOL/L (ref 3.4–5.1)
PROT SERPL-MCNC: 7.1 G/DL (ref 6.4–8.2)
PSA SERPL-MCNC: 7.62 NG/ML
RBC # BLD: 5.3 MIL/MCL (ref 4.5–5.9)
SODIUM SERPL-SCNC: 140 MMOL/L (ref 135–145)
TRIGL SERPL-MCNC: 131 MG/DL
WBC # BLD: 10.7 K/MCL (ref 4.2–11)

## 2025-05-07 PROCEDURE — 84153 ASSAY OF PSA TOTAL: CPT | Performed by: CLINICAL MEDICAL LABORATORY

## 2025-05-07 PROCEDURE — 80061 LIPID PANEL: CPT | Performed by: CLINICAL MEDICAL LABORATORY

## 2025-05-07 PROCEDURE — 85025 COMPLETE CBC W/AUTO DIFF WBC: CPT | Performed by: CLINICAL MEDICAL LABORATORY

## 2025-05-07 PROCEDURE — 80053 COMPREHEN METABOLIC PANEL: CPT | Performed by: CLINICAL MEDICAL LABORATORY

## 2025-05-07 PROCEDURE — 83036 HEMOGLOBIN GLYCOSYLATED A1C: CPT | Performed by: CLINICAL MEDICAL LABORATORY

## 2025-05-07 PROCEDURE — 36415 COLL VENOUS BLD VENIPUNCTURE: CPT | Performed by: INTERNAL MEDICINE

## 2025-05-07 RX ORDER — CHLORTHALIDONE 25 MG/1
25 TABLET ORAL DAILY
Qty: 90 TABLET | Refills: 1 | Status: SHIPPED | OUTPATIENT
Start: 2025-05-07

## 2025-05-07 RX ORDER — VALSARTAN 160 MG/1
160 TABLET ORAL DAILY
Qty: 90 TABLET | Refills: 1 | Status: SHIPPED | OUTPATIENT
Start: 2025-05-07

## 2025-05-08 ENCOUNTER — RESULTS FOLLOW-UP (OUTPATIENT)
Dept: INTERNAL MEDICINE | Age: 65
End: 2025-05-08

## 2025-05-08 DIAGNOSIS — E87.6 HYPOKALEMIA: Primary | ICD-10-CM

## 2025-05-09 RX ORDER — CHLORTHALIDONE 25 MG/1
25 TABLET ORAL DAILY
Qty: 90 TABLET | Refills: 3 | OUTPATIENT
Start: 2025-05-09

## 2025-05-09 RX ORDER — VALSARTAN 160 MG/1
160 TABLET ORAL DAILY
Qty: 90 TABLET | Refills: 3 | OUTPATIENT
Start: 2025-05-09

## 2025-05-09 RX ORDER — POTASSIUM CHLORIDE 1500 MG/1
20 TABLET, EXTENDED RELEASE ORAL DAILY
Qty: 30 TABLET | Refills: 1 | Status: CANCELLED | OUTPATIENT
Start: 2025-05-09

## 2025-05-13 RX ORDER — CHLORTHALIDONE 25 MG/1
25 TABLET ORAL DAILY
Qty: 90 TABLET | Refills: 1 | Status: SHIPPED | OUTPATIENT
Start: 2025-05-13

## 2025-05-13 RX ORDER — VALSARTAN 160 MG/1
160 TABLET ORAL DAILY
Qty: 90 TABLET | Refills: 1 | Status: SHIPPED | OUTPATIENT
Start: 2025-05-13

## 2025-07-07 ENCOUNTER — TELEPHONE (OUTPATIENT)
Dept: INTERNAL MEDICINE | Age: 65
End: 2025-07-07

## 2025-08-06 ENCOUNTER — APPOINTMENT (OUTPATIENT)
Dept: INTERNAL MEDICINE | Age: 65
End: 2025-08-06

## (undated) DEVICE — MMIS - SPONGE GAUZE 4X4IN CTN 12 PLY USP TYPE 7 STRL LF

## (undated) DEVICE — CONTAINER HST 120ML BRT YLW CLR 2 THRD PREFL TRNSPT LBL NS

## (undated) DEVICE — MMIS - BANDAGE CMPR ELITE HCMB STDLN 5.8YDX6IN POLY CTN

## (undated) DEVICE — DRAPE REINF FAN FOLD 77X56IN 42X25IN SURG CNVRT ASTOUND STRL

## (undated) DEVICE — MMIS - DRAPE SURG 120X77IN REINFORCE SPLIT ABS TUBE HLDR

## (undated) DEVICE — DRESSING WND 4X3IN ABS PAD NADH TELFA POLY CTN STRL LF

## (undated) DEVICE — PEN SURGMRK DEVON SKIN DISP REG TIP UTL LBL NONSMEAR BLK

## (undated) DEVICE — MMIS - PADDING CAST 4YDX6IN CTN WEBRIL 2 CRIMP FNSH STRL

## (undated) DEVICE — MMIS - STOCKINETTE ORTHO 48X12IN POLY CNVRT IMPRV DRAPE

## (undated) DEVICE — CONTAINER SPEC 4OZ GRAY STD SCR TOP LID GRAD TRANS STRL

## (undated) DEVICE — DRAPE 2 INCS FILM ANTIMICROBIAL 23X17IN SURG IOBAN STRL

## (undated) DEVICE — MMIS - BLADE SURG 11 STRL SS

## (undated) DEVICE — MMIS - PACK SURG GN SRG

## (undated) DEVICE — MMIS - GLOVE SURG 8 PROTEXIS LF CRM PF BEAD CUFF STRL

## (undated) DEVICE — MMIS - BANDAGE CMPR HOOK-LOCK 10YDX6IN CTN POLY YARN KNTD

## (undated) DEVICE — INSTRUMENT BIOPSY PINK 20CM 18GA MXCOR 22MM ANG 2 TRGR UL

## (undated) DEVICE — NEEDLE HYPODERMIC 25GA 1.5INL ECLIPSE NON-CE

## (undated) DEVICE — MMIS - BLADE SHVR 3.5MM AGRS

## (undated) DEVICE — MMIS - PROBE ESURG 135MM 4MM SERFAS ENRG 90-S CRUISE

## (undated) DEVICE — MMIS - STRAP PSTN 60X3IN DEVON BODY KN

## (undated) DEVICE — MMIS - TUBING SCT CLR 10FT 9/32IN MDVC MAXI-GRIP NCDTV

## (undated) DEVICE — MMIS - SYRINGE 30ML 1ML CONC TIP 1ML GRAD N-PYRG DEHP-FR

## (undated) DEVICE — MMIS - TUBING SUCT/IRR CROSSFLOW INFL CSST ARTHROSCOPY

## (undated) DEVICE — NEEDLE BIOPSY TRANSPERINEAL ACC SYS PRECISIONPOINT

## (undated) DEVICE — MMIS - WRAP CMPR 5YDX6IN COBAN POR SLFADH ELASTIC LTWT

## (undated) DEVICE — GLOVE SURG 8 PROTEXIS PI MIC LF CRM PF SMTH BEAD CUFF STRL

## (undated) DEVICE — MMIS - ELECTRODE PT RTN C30- LB 9FT CORD NONIRRITATE

## (undated) DEVICE — MMIS - SUTURE ETHILON MTPS 3-0 PS2 18IN MONO NABSB BLK

## (undated) DEVICE — GLOVE SURG 6.5 PROTEXIS PI MIC LF CRM PF SMTH BEAD CUFF STRL

## (undated) DEVICE — MMIS - SYRINGE 10ML CNTRL CONC TIP PYROGEN FREE DEHP-FR

## (undated) DEVICE — SYRINGE 30ML CONC TIP GRAD N-PYRG DEHP-FR STRL MED LF DISP

## (undated) DEVICE — NEEDLE SPNL 20GA 6IN LONG LGTH REG WALL QUINCKE TIP STRL LF

## (undated) DEVICE — CUFF BP ADULT LONG WHT LF REUSE NS

## (undated) DEVICE — 2% CHLORHEXIDINE SKIN PREP ORANGE 26ML

## (undated) DEVICE — MMIS - CUFF TRNQT CYL 34X4IN 2 PORT 1 BLDR POS LOCK CNCT

## (undated) DEVICE — MMIS - SOLUTION URMTC 0.9% NACL 3L LF IRR

## (undated) DEVICE — MMIS - 2% CHLORHEXIDINE SKIN PREP ORANGE 26ML

## (undated) DEVICE — MMIS - DRAPE SURG XTRMT 128.5X106X87IN CRC 42X30.5IN

## (undated) NOTE — LETTER
10/25/2017          To Whom It May Concern:    Whitney Clarke is currently under my medical care. Please excuse the patient from work missed as he has been ill. May return to Monday, 10/30/17.      If you require additional information please contact our

## (undated) NOTE — MR AVS SNAPSHOT
[facility]  [address], [address]  [phone]               Thank you for choosing us for your health care visit with Lillian Garrett MD.  We are glad to serve you and happy to provide you with this summar Urinalysis, Routine [E]    Complete by: Feb 09, 2017    Assoc Dx: Annual physical exam [Z00.00]           Vitamin D, 25-Hydroxy [E]    Complete by: Feb 09, 2017 (Approximate)    Assoc Dx:   Annual physical exam [I14.89]                 MyChart     Sign Women and lighter weight persons: limit to <= 1 drink* per day. Healthy Diet and Regular Exercise  [employer] [address] for making healthy food choices  -   Enjoy your food, but eat less.   Fully enjoy your food when ea